# Patient Record
Sex: MALE | Race: BLACK OR AFRICAN AMERICAN | Employment: OTHER | URBAN - METROPOLITAN AREA
[De-identification: names, ages, dates, MRNs, and addresses within clinical notes are randomized per-mention and may not be internally consistent; named-entity substitution may affect disease eponyms.]

---

## 2017-05-05 PROBLEM — R39.9 LOWER URINARY TRACT SYMPTOMS (LUTS): Status: ACTIVE | Noted: 2017-05-05

## 2017-05-05 PROBLEM — N40.2 PROSTATE NODULE: Status: ACTIVE | Noted: 2017-05-05

## 2017-08-15 ENCOUNTER — HOSPITAL ENCOUNTER (OUTPATIENT)
Dept: LAB | Age: 82
Discharge: HOME | End: 2017-08-15
Payer: MEDICARE

## 2017-08-15 DIAGNOSIS — I10 UNSPECIFIED ESSENTIAL HYPERTENSION: ICD-10-CM

## 2017-08-15 DIAGNOSIS — E78.5 HYPERLIPIDEMIA: ICD-10-CM

## 2017-08-15 LAB
ALBUMIN SERPL BCP-MCNC: 3.5 G/DL (ref 3.4–5)
ALBUMIN/GLOB SERPL: 1.1 {RATIO} (ref 0.8–1.7)
ALP SERPL-CCNC: 67 U/L (ref 45–117)
ALT SERPL-CCNC: 21 U/L (ref 16–61)
ANION GAP BLD CALC-SCNC: 10 MMOL/L (ref 3–18)
AST SERPL W P-5'-P-CCNC: 13 U/L (ref 15–37)
BILIRUB SERPL-MCNC: 0.8 MG/DL (ref 0.2–1)
BUN SERPL-MCNC: 23 MG/DL (ref 7–18)
BUN/CREAT SERPL: 18 (ref 12–20)
CALCIUM SERPL-MCNC: 8.7 MG/DL (ref 8.5–10.1)
CHLORIDE SERPL-SCNC: 108 MMOL/L (ref 100–108)
CHOLEST SERPL-MCNC: 88 MG/DL
CO2 SERPL-SCNC: 25 MMOL/L (ref 21–32)
CREAT SERPL-MCNC: 1.27 MG/DL (ref 0.6–1.3)
GLOBULIN SER CALC-MCNC: 3.1 G/DL (ref 2–4)
GLUCOSE SERPL-MCNC: 95 MG/DL (ref 74–99)
HDLC SERPL-MCNC: 40 MG/DL (ref 40–60)
HDLC SERPL: 2.2 {RATIO} (ref 0–5)
LDLC SERPL CALC-MCNC: 35.6 MG/DL (ref 0–100)
LIPID PROFILE,FLP: NORMAL
POTASSIUM SERPL-SCNC: 4.1 MMOL/L (ref 3.5–5.5)
PROT SERPL-MCNC: 6.6 G/DL (ref 6.4–8.2)
SODIUM SERPL-SCNC: 143 MMOL/L (ref 136–145)
TRIGL SERPL-MCNC: 62 MG/DL (ref ?–150)
VLDLC SERPL CALC-MCNC: 12.4 MG/DL

## 2017-08-15 PROCEDURE — 80061 LIPID PANEL: CPT | Performed by: FAMILY MEDICINE

## 2017-08-15 PROCEDURE — 36415 COLL VENOUS BLD VENIPUNCTURE: CPT | Performed by: FAMILY MEDICINE

## 2017-08-15 PROCEDURE — 80053 COMPREHEN METABOLIC PANEL: CPT | Performed by: FAMILY MEDICINE

## 2018-01-01 ENCOUNTER — HOSPITAL ENCOUNTER (OUTPATIENT)
Dept: LAB | Age: 83
Discharge: HOME | End: 2018-05-15
Payer: MEDICARE

## 2018-01-01 ENCOUNTER — OFFICE VISIT (OUTPATIENT)
Dept: FAMILY MEDICINE CLINIC | Age: 83
End: 2018-01-01

## 2018-01-01 ENCOUNTER — DOCUMENTATION ONLY (OUTPATIENT)
Dept: FAMILY MEDICINE CLINIC | Age: 83
End: 2018-01-01

## 2018-01-01 ENCOUNTER — HOSPITAL ENCOUNTER (OUTPATIENT)
Dept: LAB | Age: 83
Discharge: HOME | End: 2018-10-17
Payer: MEDICARE

## 2018-01-01 ENCOUNTER — APPOINTMENT (OUTPATIENT)
Dept: CT IMAGING | Age: 83
End: 2018-01-01
Attending: EMERGENCY MEDICINE
Payer: MEDICARE

## 2018-01-01 ENCOUNTER — TELEPHONE (OUTPATIENT)
Dept: FAMILY MEDICINE CLINIC | Age: 83
End: 2018-01-01

## 2018-01-01 ENCOUNTER — HOSPITAL ENCOUNTER (OUTPATIENT)
Dept: LAB | Age: 83
Discharge: HOME | End: 2018-10-16
Payer: MEDICARE

## 2018-01-01 ENCOUNTER — HOSPITAL ENCOUNTER (OUTPATIENT)
Dept: LAB | Age: 83
Discharge: HOME | End: 2018-10-11
Payer: MEDICARE

## 2018-01-01 ENCOUNTER — HOSPITAL ENCOUNTER (EMERGENCY)
Age: 83
Discharge: HOME | End: 2018-11-06
Attending: EMERGENCY MEDICINE
Payer: MEDICARE

## 2018-01-01 ENCOUNTER — HOSPITAL ENCOUNTER (OUTPATIENT)
Dept: LAB | Age: 83
Discharge: HOME | End: 2018-08-21
Payer: MEDICARE

## 2018-01-01 ENCOUNTER — HOSPITAL ENCOUNTER (OUTPATIENT)
Dept: CT IMAGING | Age: 83
Discharge: HOME | End: 2018-10-05
Attending: NURSE PRACTITIONER
Payer: MEDICARE

## 2018-01-01 ENCOUNTER — OFFICE VISIT (OUTPATIENT)
Dept: PULMONOLOGY | Age: 83
End: 2018-01-01

## 2018-01-01 ENCOUNTER — HOSPITAL ENCOUNTER (OUTPATIENT)
Dept: CT IMAGING | Age: 83
Discharge: HOME | End: 2018-10-24
Attending: RADIOLOGY | Admitting: RADIOLOGY
Payer: MEDICARE

## 2018-01-01 ENCOUNTER — HOSPITAL ENCOUNTER (OUTPATIENT)
Dept: LAB | Age: 83
Discharge: HOME | End: 2018-11-09

## 2018-01-01 ENCOUNTER — HOSPITAL ENCOUNTER (OUTPATIENT)
Dept: CT IMAGING | Age: 83
Discharge: HOME | End: 2018-10-18
Attending: INTERNAL MEDICINE
Payer: MEDICARE

## 2018-01-01 ENCOUNTER — HOSPITAL ENCOUNTER (OUTPATIENT)
Dept: RESPIRATORY THERAPY | Age: 83
Discharge: HOME | End: 2018-11-29
Attending: INTERNAL MEDICINE
Payer: MEDICARE

## 2018-01-01 ENCOUNTER — HOSPITAL ENCOUNTER (OUTPATIENT)
Dept: NON INVASIVE DIAGNOSTICS | Age: 83
Discharge: HOME | End: 2018-11-29
Attending: INTERNAL MEDICINE
Payer: MEDICARE

## 2018-01-01 ENCOUNTER — HOSPITAL ENCOUNTER (OUTPATIENT)
Dept: LAB | Age: 83
Discharge: HOME | End: 2018-09-21
Payer: MEDICARE

## 2018-01-01 VITALS
DIASTOLIC BLOOD PRESSURE: 80 MMHG | HEIGHT: 71 IN | RESPIRATION RATE: 22 BRPM | HEART RATE: 72 BPM | SYSTOLIC BLOOD PRESSURE: 130 MMHG | OXYGEN SATURATION: 98 % | WEIGHT: 207 LBS | BODY MASS INDEX: 28.98 KG/M2 | TEMPERATURE: 97.5 F

## 2018-01-01 VITALS
WEIGHT: 212 LBS | OXYGEN SATURATION: 99 % | BODY MASS INDEX: 30.35 KG/M2 | HEIGHT: 70 IN | HEART RATE: 76 BPM | DIASTOLIC BLOOD PRESSURE: 68 MMHG | TEMPERATURE: 98.4 F | SYSTOLIC BLOOD PRESSURE: 139 MMHG | RESPIRATION RATE: 20 BRPM

## 2018-01-01 VITALS
RESPIRATION RATE: 18 BRPM | HEIGHT: 71 IN | SYSTOLIC BLOOD PRESSURE: 146 MMHG | WEIGHT: 213 LBS | OXYGEN SATURATION: 99 % | TEMPERATURE: 98.1 F | BODY MASS INDEX: 29.82 KG/M2 | DIASTOLIC BLOOD PRESSURE: 86 MMHG | HEART RATE: 81 BPM

## 2018-01-01 VITALS
DIASTOLIC BLOOD PRESSURE: 78 MMHG | WEIGHT: 210 LBS | SYSTOLIC BLOOD PRESSURE: 126 MMHG | RESPIRATION RATE: 20 BRPM | OXYGEN SATURATION: 98 % | TEMPERATURE: 98.2 F | HEIGHT: 70 IN | BODY MASS INDEX: 30.06 KG/M2 | HEART RATE: 84 BPM

## 2018-01-01 VITALS
RESPIRATION RATE: 16 BRPM | HEART RATE: 77 BPM | TEMPERATURE: 98.2 F | HEIGHT: 70 IN | SYSTOLIC BLOOD PRESSURE: 127 MMHG | BODY MASS INDEX: 32.5 KG/M2 | WEIGHT: 227 LBS | DIASTOLIC BLOOD PRESSURE: 78 MMHG | OXYGEN SATURATION: 97 %

## 2018-01-01 VITALS
OXYGEN SATURATION: 100 % | RESPIRATION RATE: 16 BRPM | WEIGHT: 207 LBS | HEART RATE: 83 BPM | BODY MASS INDEX: 28.98 KG/M2 | TEMPERATURE: 97.6 F | DIASTOLIC BLOOD PRESSURE: 83 MMHG | SYSTOLIC BLOOD PRESSURE: 148 MMHG | HEIGHT: 71 IN

## 2018-01-01 VITALS
OXYGEN SATURATION: 99 % | TEMPERATURE: 98.7 F | HEIGHT: 70 IN | WEIGHT: 214 LBS | DIASTOLIC BLOOD PRESSURE: 64 MMHG | RESPIRATION RATE: 24 BRPM | HEART RATE: 84 BPM | SYSTOLIC BLOOD PRESSURE: 118 MMHG | BODY MASS INDEX: 30.64 KG/M2

## 2018-01-01 VITALS
OXYGEN SATURATION: 98 % | TEMPERATURE: 98 F | HEART RATE: 81 BPM | DIASTOLIC BLOOD PRESSURE: 87 MMHG | BODY MASS INDEX: 30.72 KG/M2 | RESPIRATION RATE: 16 BRPM | SYSTOLIC BLOOD PRESSURE: 149 MMHG | HEIGHT: 70 IN | WEIGHT: 214.6 LBS

## 2018-01-01 VITALS
RESPIRATION RATE: 16 BRPM | HEIGHT: 70 IN | BODY MASS INDEX: 31.35 KG/M2 | DIASTOLIC BLOOD PRESSURE: 72 MMHG | HEART RATE: 81 BPM | SYSTOLIC BLOOD PRESSURE: 108 MMHG | WEIGHT: 219 LBS | OXYGEN SATURATION: 98 % | TEMPERATURE: 98.2 F

## 2018-01-01 VITALS
SYSTOLIC BLOOD PRESSURE: 130 MMHG | BODY MASS INDEX: 28.98 KG/M2 | DIASTOLIC BLOOD PRESSURE: 80 MMHG | HEIGHT: 71 IN | WEIGHT: 207 LBS

## 2018-01-01 VITALS
WEIGHT: 217 LBS | DIASTOLIC BLOOD PRESSURE: 65 MMHG | SYSTOLIC BLOOD PRESSURE: 110 MMHG | BODY MASS INDEX: 31.07 KG/M2 | HEART RATE: 82 BPM | TEMPERATURE: 98.6 F | HEIGHT: 70 IN | OXYGEN SATURATION: 100 % | RESPIRATION RATE: 20 BRPM

## 2018-01-01 DIAGNOSIS — N39.0 E. COLI UTI: Primary | ICD-10-CM

## 2018-01-01 DIAGNOSIS — Z23 ENCOUNTER FOR IMMUNIZATION: ICD-10-CM

## 2018-01-01 DIAGNOSIS — R06.02 SOB (SHORTNESS OF BREATH): ICD-10-CM

## 2018-01-01 DIAGNOSIS — Z77.090 H/O ASBESTOS EXPOSURE: Chronic | ICD-10-CM

## 2018-01-01 DIAGNOSIS — C78.00 MALIGNANT NEOPLASM METASTATIC TO LUNG (HCC): ICD-10-CM

## 2018-01-01 DIAGNOSIS — M54.9 BACK PAIN, UNSPECIFIED BACK LOCATION, UNSPECIFIED BACK PAIN LATERALITY, UNSPECIFIED CHRONICITY: ICD-10-CM

## 2018-01-01 DIAGNOSIS — B96.20 E. COLI UTI: Primary | ICD-10-CM

## 2018-01-01 DIAGNOSIS — R06.09 DYSPNEA ON EXERTION: ICD-10-CM

## 2018-01-01 DIAGNOSIS — E11.21 TYPE 2 DIABETES WITH NEPHROPATHY (HCC): ICD-10-CM

## 2018-01-01 DIAGNOSIS — N30.00 ACUTE CYSTITIS WITHOUT HEMATURIA: ICD-10-CM

## 2018-01-01 DIAGNOSIS — I10 ESSENTIAL HYPERTENSION: ICD-10-CM

## 2018-01-01 DIAGNOSIS — C78.00 MALIGNANT NEOPLASM METASTATIC TO LUNG, UNSPECIFIED LATERALITY (HCC): ICD-10-CM

## 2018-01-01 DIAGNOSIS — R06.02 SOB (SHORTNESS OF BREATH) ON EXERTION: Primary | ICD-10-CM

## 2018-01-01 DIAGNOSIS — R16.0 LIVER MASS: ICD-10-CM

## 2018-01-01 DIAGNOSIS — R10.9 ABDOMINAL PAIN, UNSPECIFIED ABDOMINAL LOCATION: ICD-10-CM

## 2018-01-01 DIAGNOSIS — R06.02 SHORTNESS OF BREATH: ICD-10-CM

## 2018-01-01 DIAGNOSIS — N30.01 ACUTE CYSTITIS WITH HEMATURIA: Primary | ICD-10-CM

## 2018-01-01 DIAGNOSIS — Z74.09 LIMITED MOBILITY: ICD-10-CM

## 2018-01-01 DIAGNOSIS — S01.81XD LACERATION OF FOREHEAD, SUBSEQUENT ENCOUNTER: ICD-10-CM

## 2018-01-01 DIAGNOSIS — R93.89 ABNORMAL CHEST CT: Primary | ICD-10-CM

## 2018-01-01 DIAGNOSIS — E11.65 TYPE 2 DIABETES MELLITUS WITH HYPERGLYCEMIA, WITHOUT LONG-TERM CURRENT USE OF INSULIN (HCC): ICD-10-CM

## 2018-01-01 DIAGNOSIS — R91.8 MULTIPLE PULMONARY NODULES: ICD-10-CM

## 2018-01-01 DIAGNOSIS — N30.00 ACUTE CYSTITIS WITHOUT HEMATURIA: Primary | ICD-10-CM

## 2018-01-01 DIAGNOSIS — Z00.00 MEDICARE ANNUAL WELLNESS VISIT, SUBSEQUENT: Primary | ICD-10-CM

## 2018-01-01 DIAGNOSIS — E11.65 TYPE 2 DIABETES MELLITUS WITH HYPERGLYCEMIA, WITHOUT LONG-TERM CURRENT USE OF INSULIN (HCC): Primary | ICD-10-CM

## 2018-01-01 DIAGNOSIS — Z86.711 HISTORY OF PULMONARY EMBOLISM: ICD-10-CM

## 2018-01-01 DIAGNOSIS — J61 ASBESTOSIS (HCC): ICD-10-CM

## 2018-01-01 DIAGNOSIS — R53.81 PHYSICAL DECONDITIONING: ICD-10-CM

## 2018-01-01 DIAGNOSIS — K76.89 LIVER DYSFUNCTION: ICD-10-CM

## 2018-01-01 DIAGNOSIS — L20.84 INTRINSIC ECZEMA: Primary | ICD-10-CM

## 2018-01-01 DIAGNOSIS — R42 DIZZINESS: ICD-10-CM

## 2018-01-01 DIAGNOSIS — S00.83XA FACIAL CONTUSION, INITIAL ENCOUNTER: Primary | ICD-10-CM

## 2018-01-01 DIAGNOSIS — N30.01 ACUTE CYSTITIS WITH HEMATURIA: ICD-10-CM

## 2018-01-01 DIAGNOSIS — R06.02 SOB (SHORTNESS OF BREATH) ON EXERTION: ICD-10-CM

## 2018-01-01 DIAGNOSIS — J98.4 RESTRICTIVE LUNG DISEASE: ICD-10-CM

## 2018-01-01 DIAGNOSIS — E04.1 LEFT THYROID NODULE: ICD-10-CM

## 2018-01-01 DIAGNOSIS — J61 ASBESTOSIS (HCC): Primary | ICD-10-CM

## 2018-01-01 DIAGNOSIS — K76.9 LIVER LESION: ICD-10-CM

## 2018-01-01 LAB
AFP L3 MFR SERPL: 72.1 % (ref 0–9.9)
AFP SERPL-MCNC: 9 NG/ML (ref 0–8)
ALBUMIN SERPL-MCNC: 3.4 G/DL (ref 3.4–5)
ALBUMIN/GLOB SERPL: 1.1 {RATIO} (ref 0.8–1.7)
ALP SERPL-CCNC: 89 U/L (ref 45–117)
ALT SERPL-CCNC: 28 U/L (ref 16–61)
ANION GAP SERPL CALC-SCNC: 10 MMOL/L (ref 3–18)
ANION GAP SERPL CALC-SCNC: 6 MMOL/L (ref 3–18)
ANION GAP SERPL CALC-SCNC: 8 MMOL/L (ref 3–18)
APTT PPP: 23.5 SEC (ref 23–36.4)
AST SERPL-CCNC: 19 U/L (ref 15–37)
BACTERIA SPEC CULT: ABNORMAL
BACTERIA SPEC CULT: NORMAL
BASOPHILS # BLD: 0 K/UL (ref 0–0.1)
BASOPHILS # BLD: 0 K/UL (ref 0–0.1)
BASOPHILS NFR BLD: 0 % (ref 0–2)
BASOPHILS NFR BLD: 0 % (ref 0–2)
BILIRUB SERPL-MCNC: 0.6 MG/DL (ref 0.2–1)
BILIRUB UR QL STRIP: NEGATIVE
BILIRUB UR QL STRIP: NEGATIVE
BUN SERPL-MCNC: 16 MG/DL (ref 7–18)
BUN SERPL-MCNC: 21 MG/DL (ref 7–18)
BUN SERPL-MCNC: 25 MG/DL (ref 7–18)
BUN/CREAT SERPL: 15 (ref 12–20)
BUN/CREAT SERPL: 17 (ref 12–20)
BUN/CREAT SERPL: 21 (ref 12–20)
BUN/CREAT SERPL: 23 (ref 12–20)
BUN/CREAT SERPL: 23 (ref 12–20)
CALCIUM SERPL-MCNC: 8.1 MG/DL (ref 8.5–10.1)
CALCIUM SERPL-MCNC: 8.6 MG/DL (ref 8.5–10.1)
CALCIUM SERPL-MCNC: 8.7 MG/DL (ref 8.5–10.1)
CALCIUM SERPL-MCNC: 8.8 MG/DL (ref 8.5–10.1)
CALCIUM SERPL-MCNC: 9.1 MG/DL (ref 8.5–10.1)
CEA SERPL-MCNC: 2.2 NG/ML
CHLORIDE SERPL-SCNC: 104 MMOL/L (ref 100–108)
CHLORIDE SERPL-SCNC: 105 MMOL/L (ref 100–108)
CHLORIDE SERPL-SCNC: 108 MMOL/L (ref 100–108)
CHLORIDE SERPL-SCNC: 109 MMOL/L (ref 100–108)
CHLORIDE SERPL-SCNC: 109 MMOL/L (ref 100–108)
CHOLEST SERPL-MCNC: 80 MG/DL
CO2 SERPL-SCNC: 27 MMOL/L (ref 21–32)
CO2 SERPL-SCNC: 30 MMOL/L (ref 21–32)
CREAT SERPL-MCNC: 1.06 MG/DL (ref 0.6–1.3)
CREAT SERPL-MCNC: 1.07 MG/DL (ref 0.6–1.3)
CREAT SERPL-MCNC: 1.1 MG/DL (ref 0.6–1.3)
CREAT SERPL-MCNC: 1.19 MG/DL (ref 0.6–1.3)
CREAT SERPL-MCNC: 1.23 MG/DL (ref 0.6–1.3)
CREAT UR-MCNC: 244.56 MG/DL (ref 30–125)
DIFFERENTIAL METHOD BLD: ABNORMAL
DIFFERENTIAL METHOD BLD: ABNORMAL
ECHO AO ROOT DIAM: 4.06 CM
ECHO LA AREA 4C: 21.8 CM2
ECHO LA VOL 2C: 28.94 ML (ref 18–58)
ECHO LA VOL 4C: 56.81 ML (ref 18–58)
ECHO LA VOL BP: 47.03 ML (ref 18–58)
ECHO LA VOL/BSA BIPLANE: 21.98 ML/M2 (ref 16–28)
ECHO LA VOLUME INDEX A2C: 13.52 ML/M2 (ref 16–28)
ECHO LA VOLUME INDEX A4C: 26.55 ML/M2 (ref 16–28)
ECHO LV INTERNAL DIMENSION DIASTOLIC: 2.84 CM (ref 4.2–5.9)
ECHO LV INTERNAL DIMENSION SYSTOLIC: 1.92 CM
ECHO LV IVSD: 1.67 CM (ref 0.6–1)
ECHO LV MASS 2D: 188.2 G (ref 88–224)
ECHO LV MASS INDEX 2D: 87.9 G/M2 (ref 49–115)
ECHO LV POSTERIOR WALL DIASTOLIC: 1.49 CM (ref 0.6–1)
ECHO LVOT DIAM: 2.31 CM
ECHO LVOT PEAK GRADIENT: 1.7 MMHG
ECHO LVOT PEAK VELOCITY: 65.47 CM/S
ECHO LVOT VTI: 13.63 CM
ECHO MV A VELOCITY: 59.37 CM/S
ECHO MV E DECELERATION TIME (DT): 257 MS
ECHO MV E VELOCITY: 0.33 CM/S
ECHO MV E/A RATIO: 0.01
ECHO PULMONARY ARTERY SYSTOLIC PRESSURE (PASP): 55 MMHG
ECHO RV INTERNAL DIMENSION: 3.84 CM
ECHO TV REGURGITANT MAX VELOCITY: 360.57 CM/S
ECHO TV REGURGITANT PEAK GRADIENT: 52 MMHG
EOSINOPHIL # BLD: 0.2 K/UL (ref 0–0.4)
EOSINOPHIL # BLD: 0.2 K/UL (ref 0–0.4)
EOSINOPHIL NFR BLD: 2 % (ref 0–5)
EOSINOPHIL NFR BLD: 2 % (ref 0–5)
ERYTHROCYTE [DISTWIDTH] IN BLOOD BY AUTOMATED COUNT: 13.1 % (ref 11.6–14.5)
ERYTHROCYTE [DISTWIDTH] IN BLOOD BY AUTOMATED COUNT: 13.3 % (ref 11.6–14.5)
ERYTHROCYTE [DISTWIDTH] IN BLOOD BY AUTOMATED COUNT: 13.6 % (ref 11.6–14.5)
EST. AVERAGE GLUCOSE BLD GHB EST-MCNC: 105 MG/DL
EST. AVERAGE GLUCOSE BLD GHB EST-MCNC: 120 MG/DL
GLOBULIN SER CALC-MCNC: 3.2 G/DL (ref 2–4)
GLUCOSE BLD STRIP.AUTO-MCNC: 85 MG/DL (ref 70–110)
GLUCOSE SERPL-MCNC: 124 MG/DL (ref 74–99)
GLUCOSE SERPL-MCNC: 81 MG/DL (ref 74–99)
GLUCOSE SERPL-MCNC: 94 MG/DL (ref 74–99)
GLUCOSE SERPL-MCNC: 96 MG/DL (ref 74–99)
GLUCOSE SERPL-MCNC: 97 MG/DL (ref 74–99)
GLUCOSE UR-MCNC: NEGATIVE MG/DL
GLUCOSE UR-MCNC: NEGATIVE MG/DL
HBA1C MFR BLD: 5.3 % (ref 4.2–5.6)
HBA1C MFR BLD: 5.8 % (ref 4.2–5.6)
HCT VFR BLD AUTO: 39 % (ref 36–48)
HCT VFR BLD AUTO: 39.9 % (ref 36–48)
HCT VFR BLD AUTO: 42.1 % (ref 36–48)
HDLC SERPL-MCNC: 41 MG/DL (ref 40–60)
HDLC SERPL: 2 {RATIO} (ref 0–5)
HGB BLD-MCNC: 13.4 G/DL (ref 13–16)
HGB BLD-MCNC: 13.4 G/DL (ref 13–16)
HGB BLD-MCNC: 13.6 G/DL (ref 13–16)
INR PPP: 1.1 (ref 0.8–1.2)
INR PPP: 1.1 (ref 0.8–1.2)
KETONES P FAST UR STRIP-MCNC: NEGATIVE MG/DL
KETONES P FAST UR STRIP-MCNC: NEGATIVE MG/DL
LDLC SERPL CALC-MCNC: 26.4 MG/DL (ref 0–100)
LIPID PROFILE,FLP: NORMAL
LYMPHOCYTES # BLD: 1.3 K/UL (ref 0.9–3.6)
LYMPHOCYTES # BLD: 1.3 K/UL (ref 0.9–3.6)
LYMPHOCYTES NFR BLD: 16 % (ref 21–52)
LYMPHOCYTES NFR BLD: 17 % (ref 21–52)
MCH RBC QN AUTO: 29.8 PG (ref 24–34)
MCH RBC QN AUTO: 30.2 PG (ref 24–34)
MCH RBC QN AUTO: 30.2 PG (ref 24–34)
MCHC RBC AUTO-ENTMCNC: 32.3 G/DL (ref 31–37)
MCHC RBC AUTO-ENTMCNC: 33.6 G/DL (ref 31–37)
MCHC RBC AUTO-ENTMCNC: 34.4 G/DL (ref 31–37)
MCV RBC AUTO: 88 FL (ref 74–97)
MCV RBC AUTO: 88.9 FL (ref 74–97)
MCV RBC AUTO: 93.6 FL (ref 74–97)
MICROALBUMIN UR-MCNC: 4.2 MG/DL (ref 0–3)
MICROALBUMIN/CREAT UR-RTO: 17 MG/G (ref 0–30)
MONOCYTES # BLD: 0.8 K/UL (ref 0.05–1.2)
MONOCYTES # BLD: 1.1 K/UL (ref 0.05–1.2)
MONOCYTES NFR BLD: 11 % (ref 3–10)
MONOCYTES NFR BLD: 13 % (ref 3–10)
NEUTS SEG # BLD: 5.3 K/UL (ref 1.8–8)
NEUTS SEG # BLD: 5.7 K/UL (ref 1.8–8)
NEUTS SEG NFR BLD: 69 % (ref 40–73)
NEUTS SEG NFR BLD: 70 % (ref 40–73)
PH UR STRIP: 6 [PH] (ref 4.6–8)
PH UR STRIP: 6.5 [PH] (ref 4.6–8)
PLATELET # BLD AUTO: 124 K/UL (ref 135–420)
PLATELET # BLD AUTO: 136 K/UL (ref 135–420)
PLATELET # BLD AUTO: 152 K/UL (ref 135–420)
PMV BLD AUTO: 11 FL (ref 9.2–11.8)
PMV BLD AUTO: 11.6 FL (ref 9.2–11.8)
PMV BLD AUTO: 12.3 FL (ref 9.2–11.8)
POTASSIUM SERPL-SCNC: 3.8 MMOL/L (ref 3.5–5.5)
POTASSIUM SERPL-SCNC: 4.2 MMOL/L (ref 3.5–5.5)
POTASSIUM SERPL-SCNC: 4.3 MMOL/L (ref 3.5–5.5)
POTASSIUM SERPL-SCNC: 4.3 MMOL/L (ref 3.5–5.5)
POTASSIUM SERPL-SCNC: 4.5 MMOL/L (ref 3.5–5.5)
PROT SERPL-MCNC: 6.6 G/DL (ref 6.4–8.2)
PROT UR QL STRIP: NEGATIVE
PROT UR QL STRIP: NEGATIVE
PROTHROMBIN TIME: 13.6 SEC (ref 11.5–15.2)
PROTHROMBIN TIME: 13.7 SEC (ref 11.5–15.2)
RBC # BLD AUTO: 4.43 M/UL (ref 4.7–5.5)
RBC # BLD AUTO: 4.49 M/UL (ref 4.7–5.5)
RBC # BLD AUTO: 4.5 M/UL (ref 4.7–5.5)
SERVICE CMNT-IMP: ABNORMAL
SERVICE CMNT-IMP: NORMAL
SODIUM SERPL-SCNC: 140 MMOL/L (ref 136–145)
SODIUM SERPL-SCNC: 141 MMOL/L (ref 136–145)
SODIUM SERPL-SCNC: 144 MMOL/L (ref 136–145)
SP GR UR STRIP: 1.01 (ref 1–1.03)
SP GR UR STRIP: 1.02 (ref 1–1.03)
TRIGL SERPL-MCNC: 63 MG/DL (ref ?–150)
UA UROBILINOGEN AMB POC: NORMAL (ref 0.2–1)
UA UROBILINOGEN AMB POC: NORMAL (ref 0.2–1)
URINALYSIS CLARITY POC: CLEAR
URINALYSIS CLARITY POC: CLEAR
URINALYSIS COLOR POC: YELLOW
URINALYSIS COLOR POC: YELLOW
URINE BLOOD POC: NEGATIVE
URINE BLOOD POC: NORMAL
URINE LEUKOCYTES POC: NORMAL
URINE LEUKOCYTES POC: NORMAL
URINE NITRITES POC: NEGATIVE
URINE NITRITES POC: NEGATIVE
VLDLC SERPL CALC-MCNC: 12.6 MG/DL
WBC # BLD AUTO: 7.5 K/UL (ref 4.6–13.2)
WBC # BLD AUTO: 7.5 K/UL (ref 4.6–13.2)
WBC # BLD AUTO: 8.3 K/UL (ref 4.6–13.2)

## 2018-01-01 PROCEDURE — 80061 LIPID PANEL: CPT | Performed by: FAMILY MEDICINE

## 2018-01-01 PROCEDURE — 99283 EMERGENCY DEPT VISIT LOW MDM: CPT

## 2018-01-01 PROCEDURE — 88307 TISSUE EXAM BY PATHOLOGIST: CPT

## 2018-01-01 PROCEDURE — 82043 UR ALBUMIN QUANTITATIVE: CPT | Performed by: FAMILY MEDICINE

## 2018-01-01 PROCEDURE — 83036 HEMOGLOBIN GLYCOSYLATED A1C: CPT | Performed by: FAMILY MEDICINE

## 2018-01-01 PROCEDURE — 74178 CT ABD&PLV WO CNTR FLWD CNTR: CPT

## 2018-01-01 PROCEDURE — 88342 IMHCHEM/IMCYTCHM 1ST ANTB: CPT | Performed by: INTERNAL MEDICINE

## 2018-01-01 PROCEDURE — 88341 IMHCHEM/IMCYTCHM EA ADD ANTB: CPT | Performed by: INTERNAL MEDICINE

## 2018-01-01 PROCEDURE — 80048 BASIC METABOLIC PNL TOTAL CA: CPT | Performed by: FAMILY MEDICINE

## 2018-01-01 PROCEDURE — 88333 PATH CONSLTJ SURG CYTO XM 1: CPT | Performed by: INTERNAL MEDICINE

## 2018-01-01 PROCEDURE — 70450 CT HEAD/BRAIN W/O DYE: CPT

## 2018-01-01 PROCEDURE — 85027 COMPLETE CBC AUTOMATED: CPT | Performed by: INTERNAL MEDICINE

## 2018-01-01 PROCEDURE — 36415 COLL VENOUS BLD VENIPUNCTURE: CPT | Performed by: FAMILY MEDICINE

## 2018-01-01 PROCEDURE — 70486 CT MAXILLOFACIAL W/O DYE: CPT

## 2018-01-01 PROCEDURE — 93306 TTE W/DOPPLER COMPLETE: CPT

## 2018-01-01 PROCEDURE — 80048 BASIC METABOLIC PNL TOTAL CA: CPT | Performed by: RADIOLOGY

## 2018-01-01 PROCEDURE — 36415 COLL VENOUS BLD VENIPUNCTURE: CPT | Performed by: INTERNAL MEDICINE

## 2018-01-01 PROCEDURE — 74011250636 HC RX REV CODE- 250/636: Performed by: INTERNAL MEDICINE

## 2018-01-01 PROCEDURE — 71275 CT ANGIOGRAPHY CHEST: CPT

## 2018-01-01 PROCEDURE — 85610 PROTHROMBIN TIME: CPT | Performed by: INTERNAL MEDICINE

## 2018-01-01 PROCEDURE — 82378 CARCINOEMBRYONIC ANTIGEN: CPT | Performed by: INTERNAL MEDICINE

## 2018-01-01 PROCEDURE — 74011250637 HC RX REV CODE- 250/637: Performed by: RADIOLOGY

## 2018-01-01 PROCEDURE — 99152 MOD SED SAME PHYS/QHP 5/>YRS: CPT

## 2018-01-01 PROCEDURE — 87077 CULTURE AEROBIC IDENTIFY: CPT | Performed by: NURSE PRACTITIONER

## 2018-01-01 PROCEDURE — 85025 COMPLETE CBC W/AUTO DIFF WBC: CPT | Performed by: NURSE PRACTITIONER

## 2018-01-01 PROCEDURE — 94729 DIFFUSING CAPACITY: CPT

## 2018-01-01 PROCEDURE — 36415 COLL VENOUS BLD VENIPUNCTURE: CPT | Performed by: NURSE PRACTITIONER

## 2018-01-01 PROCEDURE — 74011636320 HC RX REV CODE- 636/320: Performed by: NURSE PRACTITIONER

## 2018-01-01 PROCEDURE — 85730 THROMBOPLASTIN TIME PARTIAL: CPT | Performed by: INTERNAL MEDICINE

## 2018-01-01 PROCEDURE — 74011250636 HC RX REV CODE- 250/636

## 2018-01-01 PROCEDURE — 74011636320 HC RX REV CODE- 636/320: Performed by: INTERNAL MEDICINE

## 2018-01-01 PROCEDURE — 94010 BREATHING CAPACITY TEST: CPT

## 2018-01-01 PROCEDURE — 87086 URINE CULTURE/COLONY COUNT: CPT | Performed by: NURSE PRACTITIONER

## 2018-01-01 PROCEDURE — 82962 GLUCOSE BLOOD TEST: CPT

## 2018-01-01 PROCEDURE — 94727 GAS DIL/WSHOT DETER LNG VOL: CPT

## 2018-01-01 PROCEDURE — 85025 COMPLETE CBC W/AUTO DIFF WBC: CPT | Performed by: INTERNAL MEDICINE

## 2018-01-01 PROCEDURE — 80053 COMPREHEN METABOLIC PANEL: CPT | Performed by: INTERNAL MEDICINE

## 2018-01-01 PROCEDURE — 88305 TISSUE EXAM BY PATHOLOGIST: CPT | Performed by: INTERNAL MEDICINE

## 2018-01-01 PROCEDURE — 82107 ALPHA-FETOPROTEIN L3: CPT | Performed by: INTERNAL MEDICINE

## 2018-01-01 PROCEDURE — 80048 BASIC METABOLIC PNL TOTAL CA: CPT | Performed by: NURSE PRACTITIONER

## 2018-01-01 PROCEDURE — 87186 SC STD MICRODIL/AGAR DIL: CPT | Performed by: NURSE PRACTITIONER

## 2018-01-01 RX ORDER — MIDAZOLAM HYDROCHLORIDE 1 MG/ML
1 INJECTION, SOLUTION INTRAMUSCULAR; INTRAVENOUS
Status: DISCONTINUED | OUTPATIENT
Start: 2018-01-01 | End: 2018-01-01 | Stop reason: HOSPADM

## 2018-01-01 RX ORDER — ATORVASTATIN CALCIUM 80 MG/1
80 TABLET, FILM COATED ORAL
Qty: 90 TAB | Refills: 3 | Status: SHIPPED | OUTPATIENT
Start: 2018-01-01

## 2018-01-01 RX ORDER — FENTANYL CITRATE 50 UG/ML
50 INJECTION, SOLUTION INTRAMUSCULAR; INTRAVENOUS
Status: DISCONTINUED | OUTPATIENT
Start: 2018-01-01 | End: 2018-01-01 | Stop reason: HOSPADM

## 2018-01-01 RX ORDER — TAMSULOSIN HYDROCHLORIDE 0.4 MG/1
0.4 CAPSULE ORAL
Qty: 90 CAP | Refills: 3 | Status: SHIPPED | OUTPATIENT
Start: 2018-01-01

## 2018-01-01 RX ORDER — METOPROLOL TARTRATE 50 MG/1
50 TABLET ORAL EVERY 12 HOURS
Qty: 90 TAB | Refills: 3 | Status: SHIPPED | OUTPATIENT
Start: 2018-01-01 | End: 2018-01-01 | Stop reason: SDUPTHER

## 2018-01-01 RX ORDER — FENTANYL CITRATE 50 UG/ML
INJECTION, SOLUTION INTRAMUSCULAR; INTRAVENOUS
Status: COMPLETED
Start: 2018-01-01 | End: 2018-01-01

## 2018-01-01 RX ORDER — METOPROLOL TARTRATE 50 MG/1
50 TABLET ORAL ONCE
Status: COMPLETED | OUTPATIENT
Start: 2018-01-01 | End: 2018-01-01

## 2018-01-01 RX ORDER — LEVOFLOXACIN 250 MG/1
250 TABLET ORAL DAILY
Qty: 3 TAB | Refills: 0 | Status: SHIPPED | OUTPATIENT
Start: 2018-01-01 | End: 2018-01-01

## 2018-01-01 RX ORDER — METOPROLOL TARTRATE 50 MG/1
50 TABLET ORAL EVERY 12 HOURS
Qty: 180 TAB | Refills: 3 | Status: SHIPPED | OUTPATIENT
Start: 2018-01-01

## 2018-01-01 RX ORDER — SODIUM CHLORIDE 9 MG/ML
20 INJECTION, SOLUTION INTRAVENOUS CONTINUOUS
Status: DISCONTINUED | OUTPATIENT
Start: 2018-01-01 | End: 2018-01-01 | Stop reason: HOSPADM

## 2018-01-01 RX ORDER — TRIAMCINOLONE ACETONIDE 1 MG/G
CREAM TOPICAL 2 TIMES DAILY
Qty: 30 G | Refills: 1 | Status: SHIPPED | OUTPATIENT
Start: 2018-01-01

## 2018-01-01 RX ORDER — HYDROCODONE BITARTRATE AND ACETAMINOPHEN 5; 325 MG/1; MG/1
TABLET ORAL
Refills: 0 | COMMUNITY
Start: 2018-01-01

## 2018-01-01 RX ORDER — LEVOCETIRIZINE DIHYDROCHLORIDE 5 MG/1
5 TABLET, FILM COATED ORAL
Qty: 30 TAB | Refills: 2 | Status: SHIPPED | OUTPATIENT
Start: 2018-01-01

## 2018-01-01 RX ORDER — SODIUM CHLORIDE 0.9 % (FLUSH) 0.9 %
5-10 SYRINGE (ML) INJECTION AS NEEDED
Status: DISCONTINUED | OUTPATIENT
Start: 2018-01-01 | End: 2018-01-01 | Stop reason: HOSPADM

## 2018-01-01 RX ORDER — LIDOCAINE HYDROCHLORIDE 10 MG/ML
INJECTION, SOLUTION EPIDURAL; INFILTRATION; INTRACAUDAL; PERINEURAL
Status: COMPLETED
Start: 2018-01-01 | End: 2018-01-01

## 2018-01-01 RX ORDER — METOPROLOL TARTRATE 50 MG/1
50 TABLET ORAL EVERY 12 HOURS
Qty: 60 TAB | Refills: 3 | Status: SHIPPED | OUTPATIENT
Start: 2018-01-01 | End: 2018-01-01 | Stop reason: SDUPTHER

## 2018-01-01 RX ORDER — METOPROLOL TARTRATE 50 MG/1
50 TABLET ORAL EVERY 12 HOURS
Qty: 60 TAB | Refills: 1 | Status: SHIPPED | OUTPATIENT
Start: 2018-01-01 | End: 2018-01-01 | Stop reason: SDUPTHER

## 2018-01-01 RX ORDER — FLUMAZENIL 0.1 MG/ML
0.2 INJECTION INTRAVENOUS
Status: DISCONTINUED | OUTPATIENT
Start: 2018-01-01 | End: 2018-01-01 | Stop reason: HOSPADM

## 2018-01-01 RX ORDER — PROMETHAZINE HYDROCHLORIDE 12.5 MG/1
TABLET ORAL
Refills: 3 | COMMUNITY
Start: 2018-01-01

## 2018-01-01 RX ORDER — ONDANSETRON 8 MG/1
TABLET, ORALLY DISINTEGRATING ORAL
Refills: 3 | COMMUNITY
Start: 2018-01-01

## 2018-01-01 RX ORDER — ALBUTEROL SULFATE 0.83 MG/ML
2.5 SOLUTION RESPIRATORY (INHALATION)
Qty: 3 PACKAGE | Refills: 3 | Status: SHIPPED | OUTPATIENT
Start: 2018-01-01

## 2018-01-01 RX ORDER — ATORVASTATIN CALCIUM 80 MG/1
80 TABLET, FILM COATED ORAL
Qty: 30 TAB | Refills: 3 | Status: SHIPPED | OUTPATIENT
Start: 2018-01-01 | End: 2018-01-01 | Stop reason: SDUPTHER

## 2018-01-01 RX ORDER — SULFAMETHOXAZOLE AND TRIMETHOPRIM 800; 160 MG/1; MG/1
1 TABLET ORAL 2 TIMES DAILY
Qty: 6 TAB | Refills: 0 | Status: SHIPPED | OUTPATIENT
Start: 2018-01-01 | End: 2018-01-01

## 2018-01-01 RX ORDER — NALOXONE HYDROCHLORIDE 0.4 MG/ML
0.1 INJECTION, SOLUTION INTRAMUSCULAR; INTRAVENOUS; SUBCUTANEOUS
Status: DISCONTINUED | OUTPATIENT
Start: 2018-01-01 | End: 2018-01-01 | Stop reason: HOSPADM

## 2018-01-01 RX ORDER — SODIUM CHLORIDE 0.9 % (FLUSH) 0.9 %
5-10 SYRINGE (ML) INJECTION EVERY 8 HOURS
Status: DISCONTINUED | OUTPATIENT
Start: 2018-01-01 | End: 2018-01-01 | Stop reason: HOSPADM

## 2018-01-01 RX ORDER — MIDAZOLAM HYDROCHLORIDE 1 MG/ML
INJECTION, SOLUTION INTRAMUSCULAR; INTRAVENOUS
Status: COMPLETED
Start: 2018-01-01 | End: 2018-01-01

## 2018-01-01 RX ADMIN — FENTANYL CITRATE 50 MCG: 50 INJECTION, SOLUTION INTRAMUSCULAR; INTRAVENOUS at 12:05

## 2018-01-01 RX ADMIN — IOPAMIDOL 80 ML: 755 INJECTION, SOLUTION INTRAVENOUS at 17:00

## 2018-01-01 RX ADMIN — MIDAZOLAM HYDROCHLORIDE 1 MG: 2 INJECTION, SOLUTION INTRAMUSCULAR; INTRAVENOUS at 12:05

## 2018-01-01 RX ADMIN — FENTANYL CITRATE 50 MCG: 50 INJECTION INTRAMUSCULAR; INTRAVENOUS at 12:05

## 2018-01-01 RX ADMIN — METOPROLOL TARTRATE 50 MG: 50 TABLET ORAL at 13:26

## 2018-01-01 RX ADMIN — MIDAZOLAM HYDROCHLORIDE 1 MG: 1 INJECTION, SOLUTION INTRAMUSCULAR; INTRAVENOUS at 12:10

## 2018-01-01 RX ADMIN — SODIUM CHLORIDE 20 ML/HR: 900 INJECTION, SOLUTION INTRAVENOUS at 10:02

## 2018-01-01 RX ADMIN — IOPAMIDOL 96 ML: 612 INJECTION, SOLUTION INTRAVENOUS at 12:00

## 2018-01-01 RX ADMIN — MIDAZOLAM HYDROCHLORIDE 1 MG: 1 INJECTION, SOLUTION INTRAMUSCULAR; INTRAVENOUS at 12:05

## 2018-01-01 RX ADMIN — FENTANYL CITRATE 50 MCG: 50 INJECTION, SOLUTION INTRAMUSCULAR; INTRAVENOUS at 12:10

## 2018-01-01 RX ADMIN — LIDOCAINE HYDROCHLORIDE: 10 INJECTION, SOLUTION EPIDURAL; INFILTRATION; INTRACAUDAL; PERINEURAL at 12:00

## 2018-03-13 NOTE — PROGRESS NOTES
HISTORY OF PRESENT ILLNESS  Andrews Major is a 80 y.o. male. HPI  Patient is here today for evaluation and treatment of: Rash; he is new to the practice. Rash:  C/o rash on right side of neck for years, itches, is currently spreading; He has not used any meds for this. Needs refill on meds;     PMH,  Meds, Allergies, Family History, Social history reviewed        Current Outpatient Prescriptions:     tamsulosin (FLOMAX) 0.4 mg capsule, Take 1 Cap by mouth daily (after dinner). , Disp: 90 Cap, Rfl: 3    metoprolol (LOPRESSOR) 50 mg tablet, Take 1 Tab by mouth every twelve (12) hours. , Disp: 60 Tab, Rfl: 3    atorvastatin (LIPITOR) 80 mg tablet, Take 1 Tab by mouth nightly., Disp: 30 Tab, Rfl: 3      PMH,  Meds, Allergies, Family History, Social history reviewed    Review of Systems   Constitutional: Negative for chills and fever. Cardiovascular: Negative for chest pain and palpitations. Physical Exam   Constitutional: He appears well-developed and well-nourished. No distress. Cardiovascular: Normal rate and regular rhythm. Exam reveals no gallop and no friction rub. No murmur heard. Pulmonary/Chest: Breath sounds normal. No respiratory distress. He has no wheezes. He has no rales. Musculoskeletal: He exhibits no edema. Skin:   Right lateral neck with an erythematous macular flaky rash present;    Nursing note and vitals reviewed. Visit Vitals    /78 (BP 1 Location: Right arm, BP Patient Position: Sitting)    Pulse 77    Temp 98.2 °F (36.8 °C) (Oral)    Resp 16    Ht 5' 10\" (1.778 m)    Wt 227 lb (103 kg)    SpO2 97%    BMI 32.57 kg/m2         ASSESSMENT and PLAN    ICD-10-CM ICD-9-CM    1.  Intrinsic eczema L20.84 691.8        As above, new   treatment plan as listed below  Orders Placed This Encounter    tamsulosin (FLOMAX) 0.4 mg capsule    metoprolol tartrate (LOPRESSOR) 50 mg tablet    atorvastatin (LIPITOR) 80 mg tablet    triamcinolone acetonide (KENALOG) 0.1 % topical cream       Refilled meds needed  Follow-up Disposition:  Return in about 2 months (around 5/13/2018) for medicare well . An After Visit Summary was printed and given to the patient. This has been fully explained to the patient, who indicates understanding.

## 2018-03-13 NOTE — MR AVS SNAPSHOT
Maria Luz Gonzales 
 
 
 [address] [id number] [facility] [id number] [phone] Patient: Mari Duran MRN: [id number] NVT:[id number] Visit Information Date & Time Provider Department Dept. Phone Encounter #  
 3/13/2018 10:00 AM Nichelle Shonjay, [address] [place] [address] [phone] [id number] Follow-up Instructions Return in about 2 months (around 5/13/2018) for medicare well .  
  
 12/20/2018  1:45 PM  
Any with Slava De La Vega MD  
[facility] ([facility]) Appt Note: ep- 1 year follow up  
 [address] [facility] [address] [facility]  
  
   
 [address] [facility] [id number] [place] [address] Upcoming Health Maintenance Date Due  
 FOOT EXAM Q1 1/18/1943 MICROALBUMIN Q1 1/18/1943 EYE EXAM RETINAL OR DILATED Q1 1/18/1943 DTaP/Tdap/Td series (1 - Tdap) 1/18/1954 ZOSTER VACCINE AGE 60> 11/18/1992 GLAUCOMA SCREENING Q2Y 1/18/1998 Bone Densitometry (Dexa) Screening 1/18/1998 MEDICARE YEARLY EXAM 1/18/1998 HEMOGLOBIN A1C Q6M 1/23/2014 Pneumococcal 65+ Low/Medium Risk (2 of 2 - PCV13) 7/30/2014 Influenza Age 5 to Adult 8/1/2017 LIPID PANEL Q1 8/15/2018 Allergies as of 3/13/2018  Review Complete On: 3/13/2018 By: Lars Melendez LPN No Known Allergies Current Immunizations  Never Reviewed Name Date Pneumococcal Polysaccharide (PPSV-23) 7/30/2013 11:01 AM  
  
 Not reviewed this visit You Were Diagnosed With   
  
 Codes Comments Intrinsic eczema    -  Primary ICD-10-CM: L20.84 ICD-9-CM: 691.8 Vitals BP Pulse Temp Resp Height(growth percentile) Weight(growth percentile) 127/78 (BP 1 Location: Right arm, BP Patient Position: Sitting) 77 98.2 °F (36.8 °C) (Oral) 16 5' 10\" (1.778 m) 227 lb (103 kg) SpO2 BMI Smoking Status 97% 32.57 kg/m2 Never Smoker BMI and BSA Data Body Mass Index Body Surface Area 32.57 kg/m 2 2.26 m 2 Preferred Pharmacy Pharmacy Name Phone [address] [place] [address], [address] Your Updated Medication List  
  
   
This list is accurate as of 3/13/18 11:05 AM.  Always use your most recent med list.  
  
  
  
  
 atorvastatin 80 mg tablet Commonly known as:  LIPITOR Take 1 Tab by mouth nightly. metoprolol tartrate 50 mg tablet Commonly known as:  LOPRESSOR Take 1 Tab by mouth every twelve (12) hours. tamsulosin 0.4 mg capsule Commonly known as:  FLOMAX Take 1 Cap by mouth daily (after dinner). triamcinolone acetonide 0.1 % topical cream  
Commonly known as:  KENALOG Apply  to affected area two (2) times a day. use thin layer Prescriptions Printed Refills  
 tamsulosin (FLOMAX) 0.4 mg capsule 3 Sig: Take 1 Cap by mouth daily (after dinner). Class: Print Route: Oral  
 metoprolol tartrate (LOPRESSOR) 50 mg tablet 3 Sig: Take 1 Tab by mouth every twelve (12) hours. Class: Print Route: Oral  
 atorvastatin (LIPITOR) 80 mg tablet 3 Sig: Take 1 Tab by mouth nightly. Class: Print Route: Oral  
  
Prescriptions Sent to Pharmacy Refills  
 triamcinolone acetonide (KENALOG) 0.1 % topical cream 1 Sig: Apply  to affected area two (2) times a day. use thin layer Class: Normal  
 Pharmacy: [address] [place] [address], [facility] #: [phone] Route: Topical  
  
Follow-up Instructions Return in about 2 months (around 5/13/2018) for medicare well . Patient Instructions Atopic Dermatitis: Care Instructions Your Care Instructions Atopic dermatitis (also called eczema) is a skin problem that causes intense itching and a red, raised rash. In severe cases, the rash develops clear fluid-filled blisters. The rash is not contagious.  People with this condition seem to have very sensitive immune systems that are likely to react to things that cause allergies. The immune system is the body's way of fighting infection. There is no cure for atopic dermatitis, but you may be able to control it with care at home. Follow-up care is a key part of your treatment and safety. Be sure to make and go to all appointments, and call your doctor if you are having problems. It's also a good idea to know your test results and keep a list of the medicines you take. How can you care for yourself at home? · Use moisturizer at least twice a day. · If your doctor prescribes a cream, use it as directed. If your doctor prescribes other medicine, take it exactly as directed. · Wash the affected area with water only. Soap can make dryness and itching worse. Pat dry. · Apply a moisturizer after bathing. Use a cream such as Lubriderm, Moisturel, or Cetaphil that does not irritate the skin or cause a rash. Apply the cream while your skin is still damp after lightly drying with a towel. · Use cold, wet cloths to reduce itching. · Keep cool, and stay out of the sun. · If itching affects your normal activities, an over-the-counter antihistamine, such as diphenhydramine (Benadryl) or loratadine (Claritin) may help. Read and follow all instructions on the label. When should you call for help? Call your doctor now or seek immediate medical care if: 
? · Your rash gets worse and you have a fever. ? · You have new blisters or bruises, or the rash spreads and looks like a sunburn. ? · You have signs of infection, such as: 
¨ Increased pain, swelling, warmth, or redness. ¨ Red streaks leading from the rash. ¨ Pus draining from the rash. ¨ A fever. ? · You have crusting or oozing sores. ? · You have joint aches or body aches along with your rash. ? Watch closely for changes in your health, and be sure to contact your doctor if: ? · Your rash does not clear up after 2 to 3 weeks of home treatment. ? · Itching interferes with your sleep or daily activities. Where can you learn more? Go to http://yvette-karin.info/. Enter J288 in the search box to learn more about \"Atopic Dermatitis: Care Instructions. \" Current as of: October 13, 2016 Content Version: 11.4 © 4785-8278 [employer]. Care instructions adapted under license by [employer] (which disclaims liability or warranty for this information). If you have questions about a medical condition or this instruction, always ask your healthcare professional. [address] [address] any warranty or liability for your use of this information. Introducing [facility]! Yari Rivas introduces [employer] patient portal. Now you can access parts of your medical record, email your doctor's office, and request medication refills online. 1. In your internet browser, go to [email or web address] 2. Click on the First Time User? Click Here link in the Sign In box. You will see the New Member Sign Up page. 3. Enter your [employer] Access Code exactly as it appears below. You will not need to use this code after youve completed the sign-up process. If you do not sign up before the expiration date, you must request a new code. · [employer] Access Code: [email or web address] Expires: 6/11/2018  9:46 AM 
 
4. Enter the last four digits of your Social Security Number (xxxx) and Date of Birth (mm/dd/yyyy) as indicated and click Submit. You will be taken to the next sign-up page. 5. Create a [email or web address] ID. This will be your [employer] login ID and cannot be changed, so think of one that is secure and easy to remember. 6. Create a [employer] password. You can change your password at any time. 7. Enter your Password Reset Question and Answer. This can be used at a later time if you forget your password. 8. Enter your e-mail address. You will receive e-mail notification when new information is available in [address] 9. Click Sign Up. You can now view and download portions of your medical record. 10. Click the Download Summary menu link to download a portable copy of your medical information. If you have questions, please visit the Frequently Asked Questions section of the [employer] website. Remember, [employer] is NOT to be used for urgent needs. For medical emergencies, dial 911. Now available from your iPhone and Android! Please provide this summary of care documentation to your next provider. Your primary care clinician is listed as Leila Hoyt. If you have any questions after today's visit, please call [phone].

## 2018-03-13 NOTE — PATIENT INSTRUCTIONS
Atopic Dermatitis: Care Instructions  Your Care Instructions  Atopic dermatitis (also called eczema) is a skin problem that causes intense itching and a red, raised rash. In severe cases, the rash develops clear fluid-filled blisters. The rash is not contagious. People with this condition seem to have very sensitive immune systems that are likely to react to things that cause allergies. The immune system is the body's way of fighting infection. There is no cure for atopic dermatitis, but you may be able to control it with care at home. Follow-up care is a key part of your treatment and safety. Be sure to make and go to all appointments, and call your doctor if you are having problems. It's also a good idea to know your test results and keep a list of the medicines you take. How can you care for yourself at home? · Use moisturizer at least twice a day. · If your doctor prescribes a cream, use it as directed. If your doctor prescribes other medicine, take it exactly as directed. · Wash the affected area with water only. Soap can make dryness and itching worse. Pat dry. · Apply a moisturizer after bathing. Use a cream such as Lubriderm, Moisturel, or Cetaphil that does not irritate the skin or cause a rash. Apply the cream while your skin is still damp after lightly drying with a towel. · Use cold, wet cloths to reduce itching. · Keep cool, and stay out of the sun. · If itching affects your normal activities, an over-the-counter antihistamine, such as diphenhydramine (Benadryl) or loratadine (Claritin) may help. Read and follow all instructions on the label. When should you call for help? Call your doctor now or seek immediate medical care if:  ? · Your rash gets worse and you have a fever. ? · You have new blisters or bruises, or the rash spreads and looks like a sunburn. ? · You have signs of infection, such as:  ¨ Increased pain, swelling, warmth, or redness.   ¨ Red streaks leading from the rash.  ¨ Pus draining from the rash. ¨ A fever. ? · You have crusting or oozing sores. ? · You have joint aches or body aches along with your rash. ? Watch closely for changes in your health, and be sure to contact your doctor if:  ? · Your rash does not clear up after 2 to 3 weeks of home treatment. ? · Itching interferes with your sleep or daily activities. Where can you learn more? Go to http://yvette-karin.info/. Enter P230 in the search box to learn more about \"Atopic Dermatitis: Care Instructions. \"  Current as of: October 13, 2016  Content Version: 11.4  © 1898-1759 [employer]. Care instructions adapted under license by [employer] (which disclaims liability or warranty for this information). If you have questions about a medical condition or this instruction, always ask your healthcare professional. [address] [address] any warranty or liability for your use of this information.

## 2018-05-15 PROBLEM — E11.21 TYPE 2 DIABETES WITH NEPHROPATHY (HCC): Status: ACTIVE | Noted: 2018-01-01

## 2018-05-15 NOTE — MR AVS SNAPSHOT
[address] [place] [address] 
 
 
 [address] [address] [address] [address] 
[phone] Patient: Laura Burger. MRN: [id number] NJP:[id number] Visit Information Date & Time Provider Department Dept. Phone Encounter #  
 5/15/2018 11:00 AM Tomás Blakely [address] [place] [address] [id number] Follow-up Instructions Return in about 5 months (around 10/15/2018) for dm/htn.  
  
 12/20/2018  1:45 PM  
Any with Rayo Alvarado MD  
[facility] Appt Note: ep- 1 year follow up  
 709 [facility] [address] [facility] 10[address] [facility]  
  
   
 709 [facility] [address] [address] [address] Upcoming Health Maintenance Date Due Pneumococcal 65+ Low/Medium Risk (2 of 2 - PCV13) 7/30/2014 MICROALBUMIN Q1 9/13/2018* EYE EXAM RETINAL OR DILATED Q1 9/14/2018* GLAUCOMA SCREENING Q2Y 9/20/2018* ZOSTER VACCINE AGE 60> 9/20/2018* DTaP/Tdap/Td series (1 - Tdap) 9/21/2018* FOOT EXAM Q1 10/18/2018* Influenza Age 5 to Adult 8/1/2018 LIPID PANEL Q1 8/15/2018 HEMOGLOBIN A1C Q6M 11/15/2018 MEDICARE YEARLY EXAM 5/16/2019 *Topic was postponed. The date shown is not the original due date. Allergies as of 5/15/2018  Review Complete On: 5/15/2018 By: Bassam Ramirez LPN No Known Allergies Current Immunizations  Never Reviewed Name Date Pneumococcal Polysaccharide (PPSV-23) 7/30/2013 11:01 AM  
  
 Not reviewed this visit You Were Diagnosed With   
  
 Codes Comments Medicare annual wellness visit, subsequent    -  Primary ICD-10-CM: Z00.00 ICD-9-CM: V70.0 Type 2 diabetes with nephropathy (HCC)     ICD-10-CM: E11.21 
ICD-9-CM: 250.40, 583.81 Vitals BP Pulse Temp Resp Height(growth percentile) Weight(growth percentile)  108/72 (BP 1 Location: Left arm, BP Patient Position: Sitting) 81 98.2 °F (36.8 °C) (Oral) 16 5' 10\" (1.778 m) 219 lb (99.3 kg) SpO2 BMI Smoking Status 98% 31.42 kg/m2 Never Smoker BMI and BSA Data Body Mass Index Body Surface Area  
 31.42 kg/m 2 2.21 m 2 Your Updated Medication List  
  
   
This list is accurate as of 5/15/18 12:05 PM.  Always use your most recent med list.  
  
  
  
  
 atorvastatin 80 mg tablet Commonly known as:  LIPITOR Take 1 Tab by mouth nightly. metoprolol tartrate 50 mg tablet Commonly known as:  LOPRESSOR Take 1 Tab by mouth every twelve (12) hours. tamsulosin 0.4 mg capsule Commonly known as:  FLOMAX Take 1 Cap by mouth daily (after dinner). triamcinolone acetonide 0.1 % topical cream  
Commonly known as:  KENALOG Apply  to affected area two (2) times a day. use thin layer Prescriptions Printed Refills  
 metoprolol tartrate (LOPRESSOR) 50 mg tablet 3 Sig: Take 1 Tab by mouth every twelve (12) hours. Class: Print Route: Oral  
 atorvastatin (LIPITOR) 80 mg tablet 3 Sig: Take 1 Tab by mouth nightly. Class: Print Route: Oral  
  
We Performed the Following REFERRAL TO OPHTHALMOLOGY [REF57 Custom] Follow-up Instructions Return in about 5 months (around 10/15/2018) for dm/htn. Referral Information Referral ID Referred By Referred To  
  
 [id number] Latrice Workman MD   
   [address] [place] [address] Phone: [phone] Fax: [phone] Visits Status Start Date End Date 1 New Request 5/15/18 5/15/19 If your referral has a status of pending review or denied, additional information will be sent to support the outcome of this decision. Patient Instructions Medicare Wellness Visit, Male The best way to live healthy is to have a healthy lifestyle by eating a well-balanced diet, exercising regularly, limiting alcohol and stopping smoking. Regular physical exams and screening tests are another way to keep healthy. Preventive exams provided by your health care provider can find health problems before they become diseases or illnesses. Preventive services including immunizations, screening tests, monitoring and exams can help you take care of your own health. All people over age 72 should have a pneumovax  and and a prevnar shot to prevent pneumonia. These are once in a lifetime unless you and your provider decide differently. All people over 65 should have a yearly flu shot and a tetanus vaccine every 10 years. Screening for diabetes mellitus with a blood sugar test should be done every year. Glaucoma is a disease of the eye due to increased ocular pressure that can lead to blindness and it should be done every year by an eye professional. 
 
Cardiovascular screening tests that check for elevated lipids (fatty part of blood) which can lead to heart disease and strokes should be done every 5 years. Colorectal screening that evaluates for blood or polyps in your colon should be done yearly as a stool test or every five years as a flexible sigmoidoscope or every 10 years as a colonoscopy up to age 76. Men up to age 76 may need a screening blood test for prostate cancer at certain intervals, depending on their personal and family history. This decision is between the patient and his provider. If you have been a smoker or had family history of abdominal aortic aneurysms, you and your provider may decide to schedule an ultrasound test of your aorta. Hepatitis C screening is also recommended for anyone born between [birth year] through [address]. A shingles vaccine is also recommended once in a lifetime after age 61. Your Medicare Wellness Exam is recommended annually. Here is a list of your current Health Maintenance items with a due date: 
Health Maintenance Due Topic Date Due  
 Diabetic Foot Care  01/18/1943  Albumin Urine Test  01[birth year]18/1943  Eye Exam  01[birth year]18/1943  
 DTaP/Tdap/Td  (1 - Tdap) 01/18/1954  Shingles Vaccine  11/18/1992  Glaucoma Screening   01/18/1998  Hemoglobin A1C    01/23/2014  Pneumococcal Vaccine (2 of 2 - PCV13) 07/30/2014 [employer] Annual Well Visit  03/14/2018 Pneumococcal 13-Valent Vaccine, Diphtheria Conjugate (By injection) Pneumococcal 13-Valent Vaccine, Diphtheria Conjugate (OMB-pnm-QUL-al 13-VAY-lent VAX-een, dif-THEER-ee-a KKD-bou-gnli) Prevents infections, such as pneumonia and meningitis. Brand Name(s): Prevnar 13 There may be other brand names for this medicine. When This Medicine Should Not Be Used: This vaccine is not right for everyone. You should not receive it if you had an allergic reaction to pneumococcal or diphtheria vaccine. How to Use This Medicine:  
Injectable · A nurse or other health provider will give you this medicine. This vaccine is usually given as a shot into a muscle in the thigh or upper arm. · The vaccine schedule is different for different people. ¨ Tell your doctor if your child was born prematurely. Children who were premature may need to follow a different schedule. ¨ Children younger than 10years of age: This vaccine is usually given as 3 or 4 separate shots over several months. Your child's doctor will tell you how many shots are needed and when to come back for the next one. ¨ Children older than 10years of age: This vaccine is given as a single shot. If your child recently received another pneumonia vaccine, this one should be given at least 8 weeks later. ¨ Adults older than 25years of age: This vaccine is given as a single dose. · It is very important for your child to receive all of the shots for the vaccine. · Missed dose: This vaccine must be given on a fixed schedule. If your child misses a dose, call your child's doctor for another appointment. Drugs and Foods to Avoid: Ask your doctor or pharmacist before using any other medicine, including over-the-counter medicines, vitamins, and herbal products. · Some foods and medicines can affect how this vaccine works. Tell your doctor if you are receiving a treatment or medicine that causes a weak immune system. This includes radiation treatment, steroid medicine (including hydrocortisone, methylprednisolone, prednisolone, prednisone), or cancer medicine. Warnings While Using This Medicine: · Tell your doctor if you are pregnant or breastfeeding. · Tell your doctor if you have a weak immune system. You may not be fully protected by this vaccine. Possible Side Effects While Using This Medicine:  
Call your doctor right away if you notice any of these side effects: · Allergic reaction: Itching or hives, swelling in your face or hands, swelling or tingling in your mouth or throat, chest tightness, trouble breathing · High fever If you notice these less serious side effects, talk with your doctor: · Crying, irritability, or fussiness · Joint or muscle pain · Mild skin rash · Pain, burning, redness, or swelling where the shot was given · Poor appetite · Sleep changes If you notice other side effects that you think are caused by this medicine, tell your doctor. Call your doctor for medical advice about side effects. You may report side effects to FDA at 8-544-FDA-8770 © 2017 [facility] Information is for End User's use only and may not be sold, redistributed or otherwise used for commercial purposes. The above information is an  only. It is not intended as medical advice for individual conditions or treatments. Talk to your doctor, nurse or pharmacist before following any medical regimen to see if it is safe and effective for you. Introducing [facility]!    
 Nina Chandler introduces [employer] patient portal. Now you can access parts of your medical record, email your doctor's office, and request medication refills online. 1. In your internet browser, go to [email or web address] 2. Click on the First Time User? Click Here link in the Sign In box. You will see the New Member Sign Up page. 3. Enter your EQU[employer] Access Code exactly as it appears below. You will not need to use this code after youve completed the sign-up process. If you do not sign up before the expiration date, you must request a new code. · EQU[employer] Access Code: [email or web address] Expires: 6/11/2018  9:46 AM 
 
4. Enter the last four digits of your Social Security Number (xxxx) and Date of Birth (mm/dd/yyyy) as indicated and click Submit. You will be taken to the next sign-up page. 5. Create a EQUISO ID. This will be your EQUISO login ID and cannot be changed, so think of one that is secure and easy to remember. 6. Create a EQUISO password. You can change your password at any time. 7. Enter your Password Reset Question and Answer. This can be used at a later time if you forget your password. 8. Enter your e-mail address. You will receive e-mail notification when new information is available in [address] 9. Click Sign Up. You can now view and download portions of your medical record. 10. Click the Download Summary menu link to download a portable copy of your medical information. If you have questions, please visit the Frequently Asked Questions section of the EQU[employer] website. Remember, EQUISO is NOT to be used for urgent needs. For medical emergencies, dial 911. Now available from your iPhone and Android! Please provide this summary of care documentation to your next provider. Your primary care clinician is listed as Swetha Snowball. If you have any questions after today's visit, please call [phone].

## 2018-05-15 NOTE — ACP (ADVANCE CARE PLANNING)
Advance Care Planning (ACP) Provider Conversation Snapshot    Date of ACP Conversation: 05/15/18  Persons included in Conversation:  patient  Length of ACP Conversation in minutes:  <16 minutes (Non-Billable)    Authorized Decision Maker (if patient is incapable of making informed decisions):    This person is:   NA          For Patients with Decision Making Capacity:   TBD    Conversation Outcomes / Follow-Up Plan:   Recommended completion of Advance Directive form after review of ACP materials and conversation with prospective healthcare agent   Recommended communicating the plan and making copies for the healthcare agent, personal physician, and others as appropriate (e.g., health system)

## 2018-05-15 NOTE — PATIENT INSTRUCTIONS
Medicare Wellness Visit, Male    The best way to live healthy is to have a healthy lifestyle by eating a well-balanced diet, exercising regularly, limiting alcohol and stopping smoking. Regular physical exams and screening tests are another way to keep healthy. Preventive exams provided by your health care provider can find health problems before they become diseases or illnesses. Preventive services including immunizations, screening tests, monitoring and exams can help you take care of your own health. All people over age 72 should have a pneumovax  and and a prevnar shot to prevent pneumonia. These are once in a lifetime unless you and your provider decide differently. All people over 65 should have a yearly flu shot and a tetanus vaccine every 10 years. Screening for diabetes mellitus with a blood sugar test should be done every year. Glaucoma is a disease of the eye due to increased ocular pressure that can lead to blindness and it should be done every year by an eye professional.    Cardiovascular screening tests that check for elevated lipids (fatty part of blood) which can lead to heart disease and strokes should be done every 5 years. Colorectal screening that evaluates for blood or polyps in your colon should be done yearly as a stool test or every five years as a flexible sigmoidoscope or every 10 years as a colonoscopy up to age 76. Men up to age 76 may need a screening blood test for prostate cancer at certain intervals, depending on their personal and family history. This decision is between the patient and his provider. If you have been a smoker or had family history of abdominal aortic aneurysms, you and your provider may decide to schedule an ultrasound test of your aorta. Hepatitis C screening is also recommended for anyone born between [birth year] through [address]. A shingles vaccine is also recommended once in a lifetime after age 61.     Your Medicare Wellness Exam is recommended annually. Here is a list of your current Health Maintenance items with a due date:  Health Maintenance Due   Topic Date Due    Diabetic Foot Care  01[birth year]18/1943    Albumin Urine Test  01/18/1943    Eye Exam  01[birth year]18/1943    DTaP/Tdap/Td  (1 - Tdap) 01/18/1954    Shingles Vaccine  11/18/1992    Glaucoma Screening   01/18/1998    Hemoglobin A1C    01/23/2014    Pneumococcal Vaccine (2 of 2 - PCV13) 07/30/2014    Annual Well Visit  03/14/2018     Pneumococcal 13-Valent Vaccine, Diphtheria Conjugate (By injection)   Pneumococcal 13-Valent Vaccine, Diphtheria Conjugate (PDG-qep-UIU-al 13-VAY-lent VAX-een, dif-THEER-ee-a FXA-ira-gsiy)  Prevents infections, such as pneumonia and meningitis. Brand Name(s): Prevnar 13   There may be other brand names for this medicine. When This Medicine Should Not Be Used: This vaccine is not right for everyone. You should not receive it if you had an allergic reaction to pneumococcal or diphtheria vaccine. How to Use This Medicine:   Injectable  · A nurse or other health provider will give you this medicine. This vaccine is usually given as a shot into a muscle in the thigh or upper arm. · The vaccine schedule is different for different people. ¨ Tell your doctor if your child was born prematurely. Children who were premature may need to follow a different schedule. ¨ Children younger than 10years of age: This vaccine is usually given as 3 or 4 separate shots over several months. Your child's doctor will tell you how many shots are needed and when to come back for the next one. ¨ Children older than 10years of age: This vaccine is given as a single shot. If your child recently received another pneumonia vaccine, this one should be given at least 8 weeks later. ¨ Adults older than 25years of age: This vaccine is given as a single dose. · It is very important for your child to receive all of the shots for the vaccine. · Missed dose:  This vaccine must be given on a fixed schedule. If your child misses a dose, call your child's doctor for another appointment. Drugs and Foods to Avoid:   Ask your doctor or pharmacist before using any other medicine, including over-the-counter medicines, vitamins, and herbal products. · Some foods and medicines can affect how this vaccine works. Tell your doctor if you are receiving a treatment or medicine that causes a weak immune system. This includes radiation treatment, steroid medicine (including hydrocortisone, methylprednisolone, prednisolone, prednisone), or cancer medicine. Warnings While Using This Medicine:   · Tell your doctor if you are pregnant or breastfeeding. · Tell your doctor if you have a weak immune system. You may not be fully protected by this vaccine. Possible Side Effects While Using This Medicine:   Call your doctor right away if you notice any of these side effects:  · Allergic reaction: Itching or hives, swelling in your face or hands, swelling or tingling in your mouth or throat, chest tightness, trouble breathing  · High fever  If you notice these less serious side effects, talk with your doctor:   · Crying, irritability, or fussiness  · Joint or muscle pain  · Mild skin rash  · Pain, burning, redness, or swelling where the shot was given  · Poor appetite  · Sleep changes  If you notice other side effects that you think are caused by this medicine, tell your doctor. Call your doctor for medical advice about side effects. You may report side effects to FDA at 1-896-FDA-8540  © 2017 [facility] Information is for End User's use only and may not be sold, redistributed or otherwise used for commercial purposes. The above information is an  only. It is not intended as medical advice for individual conditions or treatments. Talk to your doctor, nurse or pharmacist before following any medical regimen to see if it is safe and effective for you.

## 2018-05-15 NOTE — PROGRESS NOTES
This is the Subsequent Medicare Annual Wellness Exam, performed 12 months or more after the Initial AWV or the last Subsequent AWV    I have reviewed the patient's medical history in detail and updated the computerized patient record. Needs a diabetic foot exam, ophthalmology   Declines a urine microalbumin    He has no new complaints. History     Past Medical History:   Diagnosis Date    DDD (degenerative disc disease), lumbar spine and pelvis     Diabetes ([address]     Heart disease     High blood pressure     Hypercholesteremia     Kidney stone     Renal function impairment       Past Surgical History:   Procedure Laterality Date    HX APPENDECTOMY       Current Outpatient Prescriptions   Medication Sig Dispense Refill    tamsulosin (FLOMAX) 0.4 mg capsule Take 1 Cap by mouth daily (after dinner). 90 Cap 3    metoprolol tartrate (LOPRESSOR) 50 mg tablet Take 1 Tab by mouth every twelve (12) hours. 60 Tab 3    atorvastatin (LIPITOR) 80 mg tablet Take 1 Tab by mouth nightly. 30 Tab 3    triamcinolone acetonide (KENALOG) 0.1 % topical cream Apply  to affected area two (2) times a day. use thin layer 30 g 1     No Known Allergies  History reviewed. No pertinent family history.   Social History   Substance Use Topics    Smoking status: Never Smoker    Smokeless tobacco: Never Used    Alcohol use Yes      Comment: ocassionally     Patient Active Problem List   Diagnosis Code    DDD (degenerative disc disease), lumbar spine and pelvis M51.36    Acute pulmonary embolism ([address] I26.99    H/O asbestos exposure Z77.090    Troponin level elevated R74.8    Chronic kidney disease, stage II (mild) N18.2    Diabetes type 2, controlled ([address] E11.9    Dyslipidemia E78.5    Prostate nodule N40.2    Lower urinary tract symptoms (LUTS) R39.9    Heart disease I51.9    Diabetes ([address] E11.9    High blood pressure I10    Hypercholesteremia E78.00    Kidney stone N20.0    Renal function impairment N28.9    Type 2 diabetes with nephropathy (HCC) E11.21       Depression Risk Factor Screening:     PHQ over the last two weeks 3/13/2018   Little interest or pleasure in doing things Not at all   Feeling down, depressed or hopeless Not at all   Total Score PHQ 2 0     Alcohol Risk Factor Screening: You do not drink alcohol or very rarely. Functional Ability and Level of Safety:   Hearing Loss  Hearing is good. Activities of Daily Living  The home contains: no safety equipment. Patient does total self care    Fall Risk  Fall Risk Assessment, last 12 mths 3/13/2018   Able to walk? Yes   Fall in past 12 months? Yes   Fall with injury? No   Number of falls in past 12 months 1   Fall Risk Score 1       Abuse Screen  Patient is not abused    Cognitive Screening   Evaluation of Cognitive Function:  Has your family/caregiver stated any concerns about your memory: no  Normal    Patient Care Team   Patient Care Team:  Amandeep Barbosa MD as PCP - General (Family Practice)     Visit Vitals    /72 (BP 1 Location: Left arm, BP Patient Position: Sitting)    Pulse 81    Temp 98.2 °F (36.8 °C) (Oral)    Resp 16    Ht 5' 10\" (1.778 m)    Wt 219 lb (99.3 kg)    SpO2 98%    BMI 31.42 kg/m2     General appearance: alert, cooperative, no distress, appears stated age  Neck: supple, symmetrical, trachea midline, no adenopathy, thyroid: not enlarged, symmetric, no tenderness/mass/nodules, no carotid bruit and no JVD  Lungs: clear to auscultation bilaterally  Heart: regular rate and rhythm, S1, S2 normal, no murmur, click, rub or gallop  Extremities: extremities normal, atraumatic, no cyanosis or edema  Sensory exam of the foot is normal, tested with the monofilament. Good pulses, no lesions or ulcers, good peripheral pulses.         Assessment/Plan   Education and counseling provided:  Are appropriate based on today's review and evaluation  End-of-Life planning (with patient's consent)  Cardiovascular screening blood test    Diagnoses and all orders for this visit:    1. Medicare annual wellness visit, subsequent    2. Type 2 diabetes with nephropathy (HCC)  -     REFERRAL TO OPHTHALMOLOGY  -     LIPID PANEL; Future  -     METABOLIC PANEL, BASIC; Future  -     HEMOGLOBIN A1C WITH EAG; Future    3. Encounter for immunization  -     Pneumococcal Conjugate vaccine - 13 valent (50 years and older)  -     Administration fee () for Medicare insured patients    Other orders  -     metoprolol tartrate (LOPRESSOR) 50 mg tablet; Take 1 Tab by mouth every twelve (12) hours. -     atorvastatin (LIPITOR) 80 mg tablet; Take 1 Tab by mouth nightly. There are no preventive care reminders to display for this patient. Follow-up Disposition:  Return in about 5 months (around 10/15/2018) for dm/htn. An After Visit Summary was printed and given to the patient. This has been fully explained to the patient, who indicates understanding.

## 2018-05-15 NOTE — PROGRESS NOTES
1. Have you been to the ER, urgent care clinic since your last visit? Hospitalized since your last visit? No    2. Have you seen or consulted any other health care providers outside of the [address] [place] [address] since your last visit? Include any pap smears or colon screening.  No

## 2018-06-04 NOTE — TELEPHONE ENCOUNTER
[address] calling they received referral for patient on May 16 have tried to call him several times not getting a answer, and voicemail is full

## 2018-08-21 NOTE — PROGRESS NOTES
Subjective:   Catalina Daley is a 80 y.o. male with complaint of low back pain, aching. accompanied by his son. Duration several days  Associated symptoms none  ROS: denies fever, chills, nausea or vomiting, no dysuria no urinary frequency     Current Outpatient Prescriptions   Medication Sig Dispense Refill    metoprolol tartrate (LOPRESSOR) 50 mg tablet Take 1 Tab by mouth every twelve (12) hours. 90 Tab 3    atorvastatin (LIPITOR) 80 mg tablet Take 1 Tab by mouth nightly. 90 Tab 3    tamsulosin (FLOMAX) 0.4 mg capsule Take 1 Cap by mouth daily (after dinner). 90 Cap 3    triamcinolone acetonide (KENALOG) 0.1 % topical cream Apply  to affected area two (2) times a day. use thin layer 30 g 1      PMH: reviewed medications and allergy lists and medical and family history. Lab Results   Component Value Date/Time    GFR est AA >60 05/15/2018 12:23 PM    GFR est non-AA 58 (L) 05/15/2018 12:23 PM    Creatinine 1.19 05/15/2018 12:23 PM    BUN 25 (H) 05/15/2018 12:23 PM    Sodium 141 05/15/2018 12:23 PM    Potassium 4.3 05/15/2018 12:23 PM    Chloride 104 05/15/2018 12:23 PM    CO2 27 05/15/2018 12:23 PM       OBJECTIVE:  Awake and alert in no acute distress  Lungs clear throughout  S1 S2 RRR without ectopy or murmur auscultated. Abdomen: normoactive bowel sounds all quadrants, no tenderness to abdomen upper and lower quadrants.  No hepatosplenomegaly negative CVAT bilaterally  Visit Vitals    /65 (BP 1 Location: Left arm, BP Patient Position: Sitting)    Pulse 82    Temp 98.6 °F (37 °C) (Oral)    Resp 20    Ht 5' 10\" (1.778 m)    Wt 217 lb (98.4 kg)    SpO2 100%    BMI 31.14 kg/m2     Results for orders placed or performed in visit on 08/21/18   AMB POC URINALYSIS DIP STICK AUTO W/O MICRO   Result Value Ref Range    Color (UA POC) Yellow     Clarity (UA POC) Clear     Glucose (UA POC) Negative Negative    Bilirubin (UA POC) Negative Negative    Ketones (UA POC) Negative Negative    Specific gravity (UA POC) 1.015 1.001 - 1.035    Blood (UA POC) Trace Negative    pH (UA POC) 6.5 4.6 - 8.0    Protein (UA POC) Negative Negative    Urobilinogen (UA POC) 2 mg/dL 0.2 - 1    Nitrites (UA POC) Negative Negative    Leukocyte esterase (UA POC) 3+ Negative     Diagnoses and all orders for this visit:    1. Acute cystitis with hematuria  -     CULTURE, URINE; Future  -     trimethoprim-sulfamethoxazole (BACTRIM DS, SEPTRA DS) 160-800 mg per tablet; Take 1 Tab by mouth two (2) times a day for 3 days. 2. Back pain, unspecified back location, unspecified back pain laterality, unspecified chronicity  -     AMB POC URINALYSIS DIP STICK AUTO W/O MICRO      Reviewed risks and benefits and common side effects of new medication  General comfort measures  Case review with Dr. Andrew Marmolejo  I have discussed the diagnosis with the patient and the intended plan as seen in the above orders. The patient has received an after-visit summary and questions were answered concerning future plans. I have discussed medication side effects and warnings with the patient as well. Follow-up Disposition:  Return if symptoms worsen or fail to improve.

## 2018-08-21 NOTE — PATIENT INSTRUCTIONS
Urinary Tract Infections in Men: Care Instructions  Your Care Instructions    A urinary tract infection, or UTI, is a general term for an infection anywhere between the kidneys and the tip of the penis. UTIs can also be a result of a prostate problem. Most cause pain or burning when you urinate. Most UTIs are caused by bacteria and can be cured with antibiotics. It is important to complete your treatment so that the infection does not get worse. Follow-up care is a key part of your treatment and safety. Be sure to make and go to all appointments, and call your doctor if you are having problems. It's also a good idea to know your test results and keep a list of the medicines you take. How can you care for yourself at home? · Take your antibiotics as prescribed. Do not stop taking them just because you feel better. You need to take the full course of antibiotics. · Take your medicines exactly as prescribed. Your doctor may have prescribed a medicine, such as phenazopyridine (Pyridium), to help relieve pain when you urinate. This turns your urine orange. You may stop taking it when your symptoms get better. But be sure to take all of your antibiotics, which treat the infection. · Drink extra water for the next day or two. This will help make the urine less concentrated and help wash out the bacteria causing the infection. (If you have kidney, heart, or liver disease and have to limit your fluids, talk with your doctor before you increase your fluid intake.)  · Avoid drinks that are carbonated or have caffeine. They can irritate the bladder. · Urinate often. Try to empty your bladder each time. · To relieve pain, take a hot bath or lay a heating pad (set on low) over your lower belly or genital area. Never go to sleep with a heating pad in place. To help prevent UTIs  · Drink plenty of fluids, enough so that your urine is light yellow or clear like water.  If you have kidney, heart, or liver disease and have to limit fluids, talk with your doctor before you increase the amount of fluids you drink. · Urinate when you have the urge. Do not hold your urine for a long time. Urinate before you go to sleep. · Keep your penis clean. Catheter care  If you have a drainage tube (catheter) in place, the following steps will help you care for it. · Always wash your hands before and after touching your catheter. · Check the area around the urethra for inflammation or signs of infection. Signs of infection include irritated, swollen, red, or tender skin, or pus around the catheter. · Clean the area around the catheter with soap and water two times a day. Dry with a clean towel afterward. · Do not apply powder or lotion to the skin around the catheter. To empty the urine collection bag  · Wash your hands with soap and water. · Without touching the drain spout, remove the spout from its sleeve at the bottom of the collection bag. Open the valve on the spout. · Let the urine flow out of the bag and into the toilet or a container. Do not let the tubing or drain spout touch anything. · After you empty the bag, clean the end of the drain spout with tissue and water. Close the valve and put the drain spout back into its sleeve at the bottom of the collection bag. · Wash your hands with soap and water. When should you call for help? Call your doctor now or seek immediate medical care if:    · Symptoms such as a fever, chills, nausea, or vomiting get worse or happen for the first time.     · You have new pain in your back just below your rib cage. This is called flank pain.     · There is new blood or pus in your urine.     · You are not able to take or keep down your antibiotics.    Watch closely for changes in your health, and be sure to contact your doctor if:    · You are not getting better after taking an antibiotic for 2 days.     · Your symptoms go away but then come back. Where can you learn more?   Go to http://yvette-karin.info/. Enter U874 in the search box to learn more about \"Urinary Tract Infections in Men: Care Instructions. \"  Current as of: May 12, 2017  Content Version: 11.7  © 1985-4897 [employer]. Care instructions adapted under license by [employer] (which disclaims liability or warranty for this information). If you have questions about a medical condition or this instruction, always ask your healthcare professional. [place] [address] any warranty or liability for your use of this information. Sulfamethoxazole/Trimethoprim (Bactrim, Bactrim DS, SMZ-TMP Pediatric, Septra) - (By mouth)   Why this medicine is used:   Treats or prevents infections. Contact a nurse or doctor right away if you have:  · Severe nausea, vomiting, or stomach pain  · Dark urine or pale stools  · Confusion, weakness  · Severe or bloody diarrhea  · Skin rash, purple spots on your skin, or very pale or yellow skin     Common side effects:  · Mild nausea or vomiting  · Loss of apetite  © 2017 [facility] Information is for End User's use only and may not be sold, redistributed or otherwise used for commercial purposes.

## 2018-08-21 NOTE — MR AVS SNAPSHOT
[address] [facility] 
 
 
 [address] [place], [place] [address] [address] [address] 
[phone] Patient: Shanelle Zayas. MRN: [id number] [facility]:[id number] Visit Information Date & Time Provider Department Dept. Phone Encounter #  
 8/21/2018 10:40 AM KIKE Garica [address] [facility] [id number] Follow-up Instructions Return if symptoms worsen or fail to improve. Your Appointments 10/16/2018 11:00 AM  
Office Visit with MD Emigdio [facility] Appt Note: 5m fu htn  
 [address] [facility] [address] [address]  
[phone]  
  
   
 [address], [place]  
  
    
  
 12/20/2018  1:45 PM  
Any with Gene Valentin MD  
[facility] Appt Note: ep- 1 year follow up  
 [address] [facility] [address] [facility]  
  
   
 [address] [facility] [address] [facility] [address] [place] [address] Upcoming Health Maintenance Date Due MICROALBUMIN Q1 9/13/2018* EYE EXAM RETINAL OR DILATED Q1 9/14/2018* GLAUCOMA SCREENING Q2Y 9/20/2018* ZOSTER VACCINE AGE 60> 9/20/2018* DTaP/Tdap/Td series (1 - Tdap) 9/21/2018* FOOT EXAM Q1 10/18/2018* Influenza Age 5 to Adult 11/21/2018* HEMOGLOBIN A1C Q6M 11/15/2018 LIPID PANEL Q1 5/15/2019 MEDICARE YEARLY EXAM 5/16/2019 *Topic was postponed. The date shown is not the original due date. Allergies as of 8/21/2018  Review Complete On: 8/21/2018 By: Angel Robles NP No Known Allergies Current Immunizations  Never Reviewed Name Date Pneumococcal Conjugate (PCV-13) 5/15/2018 Pneumococcal Polysaccharide (PPSV-23) 7/30/2013 11:01 AM  
  
 Not reviewed this visit You Were Diagnosed With   
  
 Codes Comments Acute cystitis with hematuria    -  Primary ICD-10-CM: N30.01 
ICD-9-CM: 595.0 Back pain, unspecified back location, unspecified back pain laterality, unspecified chronicity     ICD-10-CM: M54.9 ICD-9-CM: 724.5 Vitals BP Pulse Temp Resp Height(growth percentile) Weight(growth percentile) 110/65 (BP 1 Location: Left arm, BP Patient Position: Sitting) 82 98.6 °F (37 °C) (Oral) 20 5' 10\" (1.778 m) 217 lb (98.4 kg) SpO2 BMI Smoking Status 100% 31.14 kg/m2 Never Smoker BMI and BSA Data Body Mass Index Body Surface Area  
 31.14 kg/m 2 2.2 m 2 Preferred Pharmacy Pharmacy Name Phone [id number]- [facility] Sanchez, [address] [place] [address] Your Updated Medication List  
  
   
This list is accurate as of 8/21/18 11:19 AM.  Always use your most recent med list.  
  
  
  
  
 atorvastatin 80 mg tablet Commonly known as:  LIPITOR Take 1 Tab by mouth nightly. metoprolol tartrate 50 mg tablet Commonly known as:  LOPRESSOR Take 1 Tab by mouth every twelve (12) hours. tamsulosin 0.4 mg capsule Commonly known as:  FLOMAX Take 1 Cap by mouth daily (after dinner). triamcinolone acetonide 0.1 % topical cream  
Commonly known as:  KENALOG Apply  to affected area two (2) times a day. use thin layer  
  
 trimethoprim-sulfamethoxazole 160-800 mg per tablet Commonly known as:  BACTRIM DS, SEPTRA DS Take 1 Tab by mouth two (2) times a day for 3 days. Prescriptions Sent to Pharmacy Refills  
 trimethoprim-sulfamethoxazole (BACTRIM DS, SEPTRA DS) 160-800 mg per tablet 0 Sig: Take 1 Tab by mouth two (2) times a day for 3 days. Class: Normal  
 Pharmacy: [facility]/pharmacy #[id number]- [place] Sanchez, [address] [facility] [address] Ph #: [phone] Route: Oral  
  
We Performed the Following AMB POC URINALYSIS DIP STICK AUTO W/O MICRO [33674 CPT(R)] Follow-up Instructions Return if symptoms worsen or fail to improve. To-Do List   
 08/21/2018 Microbiology:  CULTURE, URINE Patient Instructions Urinary Tract Infections in Men: Care Instructions Your Care Instructions A urinary tract infection, or UTI, is a general term for an infection anywhere between the kidneys and the tip of the penis. UTIs can also be a result of a prostate problem. Most cause pain or burning when you urinate. Most UTIs are caused by bacteria and can be cured with antibiotics. It is important to complete your treatment so that the infection does not get worse. Follow-up care is a key part of your treatment and safety. Be sure to make and go to all appointments, and call your doctor if you are having problems. It's also a good idea to know your test results and keep a list of the medicines you take. How can you care for yourself at home? · Take your antibiotics as prescribed. Do not stop taking them just because you feel better. You need to take the full course of antibiotics. · Take your medicines exactly as prescribed. Your doctor may have prescribed a medicine, such as phenazopyridine (Pyridium), to help relieve pain when you urinate. This turns your urine orange. You may stop taking it when your symptoms get better. But be sure to take all of your antibiotics, which treat the infection. · Drink extra water for the next day or two. This will help make the urine less concentrated and help wash out the bacteria causing the infection. (If you have kidney, heart, or liver disease and have to limit your fluids, talk with your doctor before you increase your fluid intake.) · Avoid drinks that are carbonated or have caffeine. They can irritate the bladder. · Urinate often. Try to empty your bladder each time. · To relieve pain, take a hot bath or lay a heating pad (set on low) over your lower belly or genital area. Never go to sleep with a heating pad in place. To help prevent UTIs · Drink plenty of fluids, enough so that your urine is light yellow or clear like water. If you have kidney, heart, or liver disease and have to limit fluids, talk with your doctor before you increase the amount of fluids you drink. · Urinate when you have the urge. Do not hold your urine for a long time. Urinate before you go to sleep. · Keep your penis clean. Catheter care If you have a drainage tube (catheter) in place, the following steps will help you care for it. · Always wash your hands before and after touching your catheter. · Check the area around the urethra for inflammation or signs of infection. Signs of infection include irritated, swollen, red, or tender skin, or pus around the catheter. · Clean the area around the catheter with soap and water two times a day. Dry with a clean towel afterward. · Do not apply powder or lotion to the skin around the catheter. To empty the urine collection bag · Wash your hands with soap and water. · Without touching the drain spout, remove the spout from its sleeve at the bottom of the collection bag. Open the valve on the spout. · Let the urine flow out of the bag and into the toilet or a container. Do not let the tubing or drain spout touch anything. · After you empty the bag, clean the end of the drain spout with tissue and water. Close the valve and put the drain spout back into its sleeve at the bottom of the collection bag. · Wash your hands with soap and water. When should you call for help? Call your doctor now or seek immediate medical care if: 
  · Symptoms such as a fever, chills, nausea, or vomiting get worse or happen for the first time.  
  · You have new pain in your back just below your rib cage. This is called flank pain.  
  · There is new blood or pus in your urine.  
  · You are not able to take or keep down your antibiotics.  
 Watch closely for changes in your health, and be sure to contact your doctor if: 
  · You are not getting better after taking an antibiotic for 2 days.   · Your symptoms go away but then come back. Where can you learn more? Go to http://yvette-karin.info/. Enter C145 in the search box to learn more about \"Urinary Tract Infections in Men: Care Instructions. \" Current as of: May 12, 2017 Content Version: 11.7 © 2945-4682 [employer]. Care instructions adapted under license by [employer] (which disclaims liability or warranty for this information). If you have questions about a medical condition or this instruction, always ask your healthcare professional. [place] [address] any warranty or liability for your use of this information. Sulfamethoxazole/Trimethoprim (Bactrim, Bactrim DS, SMZ-TMP Pediatric, Septra) - (By mouth) Why this medicine is used:  
Treats or prevents infections. Contact a nurse or doctor right away if you have: · Severe nausea, vomiting, or stomach pain · Dark urine or pale stools · Confusion, weakness · Severe or bloody diarrhea 
· Skin rash, purple spots on your skin, or very pale or yellow skin Common side effects: · Mild nausea or vomiting · Loss of apetite © 2017 [address]  Information is for End User's use only and may not be sold, redistributed or otherwise used for commercial purposes. Introducing [facility] & HEALTH SERVICES! [facility] introduces [employer] patient portal. Now you can access parts of your medical record, email your doctor's office, and request medication refills online. 1. In your internet browser, go to [email or web address] 2. Click on the First Time User? Click Here link in the Sign In box. You will see the New Member Sign Up page. 3. Enter your [employer] Access Code exactly as it appears below. You will not need to use this code after youve completed the sign-up process. If you do not sign up before the expiration date, you must request a new code.  
 
· Inn[employer]ra Access Code: [email or web address] 
 Expires: 11/19/2018 11:19 AM 
 
4. Enter the last four digits of your Social Security Number (xxxx) and Date of Birth (mm/dd/yyyy) as indicated and click Submit. You will be taken to the next sign-up page. 5. Create a [employer] ID. This will be your [employer] login ID and cannot be changed, so think of one that is secure and easy to remember. 6. Create a [employer] password. You can change your password at any time. 7. Enter your Password Reset Question and Answer. This can be used at a later time if you forget your password. 8. Enter your e-mail address. You will receive e-mail notification when new information is available in [address] 9. Click Sign Up. You can now view and download portions of your medical record. 10. Click the Download Summary menu link to download a portable copy of your medical information. If you have questions, please visit the Frequently Asked Questions section of the [employer] website. Remember, [employer] is NOT to be used for urgent needs. For medical emergencies, dial 911. Now available from your iPhone and Android! Please provide this summary of care documentation to your next provider. Your primary care clinician is listed as [address]. If you have any questions after today's visit, please call [phone].

## 2018-08-24 NOTE — PROGRESS NOTES
Please call patient and have him stop the Septra DS it is not working on his UTI. Have started Levaquin 250 mg one daily for 3 days.    Savage NAVARRO

## 2018-08-24 NOTE — TELEPHONE ENCOUNTER
Pt's son returned phone call. Pt's son was notified to stop septra and that Levaquin was ready to be picked up from the pharmacy. Pt's son understood directions.

## 2018-08-24 NOTE — TELEPHONE ENCOUNTER
----- Message from Rox Temple NP sent at 8/24/2018  1:30 PM EDT -----  Please call patient and have him stop the Septra DS it is not working on his UTI. Have started Levaquin 250 mg one daily for 3 days.    Lavern Kate NP    Patient Calls            Rox Temple NP routed conversation to You 17 minutes ago (1:29 PM)                            Orders Placed This Encounter            Active        levoFLOXacin (LEVAQUIN) 250 mg tablet Ordered On: 08/24/2018

## 2018-08-24 NOTE — TELEPHONE ENCOUNTER
Spoke with the patient today. Advised the patient that he will need to stop Septra. New antibiotic sent to the pharmacy. Patient verbalized understanding.

## 2018-09-20 NOTE — TELEPHONE ENCOUNTER
Spoke with patient caregiver, Bramwell perm to release on file. States patient has been complaining of intermittent dizziness with postural changes. No chest pain, heart palpitations, shortness of breath noted at this time. If symptoms worsens go to the ER. Appointment has been scheduled.

## 2018-09-21 NOTE — PROGRESS NOTES
HISTORY OF PRESENT ILLNESS  Tobin Blackmon is a 80 y.o. male. HPI Comments: States he has a gnawing in his stomach. Reports he has been having a hard time breathing with exertion per son. Denies coughing. Dizziness    The history is provided by the patient and relative (son). This is a new problem. The current episode started more than 1 week ago (previous episode within the last 2 months ). The problem has not changed since onset. There was no loss of consciousness. The problem is associated with standing up. Associated symptoms include malaise/fatigue, dizziness and light-headedness. Pertinent negatives include no chest pain, no palpitations, no clumsiness, no confusion, no abdominal pain, no headaches, no focal weakness and no slurred speech. He has tried nothing for the symptoms. His past medical history is significant for HTN. His past medical history does not include no CVA or no TIA. No Known Allergies  Current Outpatient Prescriptions   Medication Sig Dispense Refill    metoprolol tartrate (LOPRESSOR) 50 mg tablet Take 1 Tab by mouth every twelve (12) hours. 90 Tab 3    atorvastatin (LIPITOR) 80 mg tablet Take 1 Tab by mouth nightly. 90 Tab 3    tamsulosin (FLOMAX) 0.4 mg capsule Take 1 Cap by mouth daily (after dinner). 90 Cap 3    triamcinolone acetonide (KENALOG) 0.1 % topical cream Apply  to affected area two (2) times a day. use thin layer 30 g 1     Past Medical History:   Diagnosis Date    DDD (degenerative disc disease), lumbar spine and pelvis     Diabetes ([facility] [address]     Heart disease     High blood pressure     Hypercholesteremia     Kidney stone     Renal function impairment       Review of Systems   Constitutional: Positive for malaise/fatigue. Respiratory: Positive for shortness of breath. Negative for cough. Cardiovascular: Negative for chest pain and palpitations. Gastrointestinal: Negative for abdominal pain.    Neurological: Positive for dizziness and light-headedness. Negative for focal weakness and headaches. Psychiatric/Behavioral: Negative for confusion. /81 (BP 1 Location: Left arm, BP Patient Position: Sitting)  Pulse 84  Temp 98.2 °F (36.8 °C) (Oral)   Resp 20  Ht 5' 10\" (1.778 m)  Wt 210 lb (95.3 kg)  SpO2 98%  BMI 30.13 kg/m2    Physical Exam   Constitutional: He appears well-developed and well-nourished. No distress. HENT:   Head: Normocephalic and atraumatic. Neck: Normal range of motion. Neck supple. Cardiovascular: Normal rate, regular rhythm and normal heart sounds. Exam reveals no gallop and no friction rub. No murmur heard. Pulmonary/Chest: Effort normal and breath sounds normal. He has no wheezes. He has no rhonchi. He has no rales. Abdominal: Soft. Bowel sounds are normal. There is tenderness (mild ) in the right lower quadrant. There is CVA tenderness (mild right ). ASSESSMENT and PLAN    ICD-10-CM ICD-9-CM    1. Acute cystitis without hematuria N30.00 595.0 CULTURE, URINE   2. Abdominal pain, unspecified abdominal location R10.9 789.00 AMB POC URINALYSIS DIP STICK AUTO W/O MICRO   3. Dizziness R42 780.4 CBC WITH AUTOMATED DIFF      METABOLIC PANEL, BASIC     Orders Placed This Encounter    CULTURE, URINE    CBC WITH AUTOMATED DIFF    METABOLIC PANEL, BASIC    AMB POC URINALYSIS DIP STICK AUTO W/O MICRO    metoprolol tartrate (LOPRESSOR) 50 mg tablet    levoFLOXacin (LEVAQUIN) 250 mg tablet   alarm signs when to seek emergent care provided and reviewed   I have discussed the diagnosis with the patient and son and the intended plan as seen in the above orders. The patient and son has received an after-visit summary and questions were answered concerning future plans. I have discussed medication side effects and warnings with the patient and son as well. patient and son verbalizes understanding  patient and son agreeable with above plan and verbalizes understanding.   Follow-up Disposition:  Return in about 10 days (around 10/1/2018) for dizziness.

## 2018-09-21 NOTE — MR AVS SNAPSHOT
[address] [facility] [place] 
 
 
 [address] [place], [place] [address] [address] 
[phone] Patient: Carmelita Patten MRN: [id number] Visit Information Date & Time Provider Department Dept. Phone Encounter #  
 9/21/2018  9:00 AM Ra Lozada NP 25[address] [facility] [phone] [id number] Follow-up Instructions Return in about 10 weeks (around 11/30/2018) for dizziness. Your Appointments 10/16/2018 11:00 AM  
Office Visit with MD Carlos Villareal [facility] Appt Note: 5m fu htn  
 [address], [facility] [address] [address]  
[phone]  
  
   
 [address], [place]  
  
    
  
 12/20/2018  1:45 PM  
Any with Alan Huffman MD  
[facility] ([facility] Appt Note: ep- 1 year follow up  
 [address] [facility] [address] [id number] [address]  
  
   
 [address] [facility] [address] [facility],[address] 200 [facility] Upcoming Health Maintenance Date Due MICROALBUMIN Q1 1/18/1943 EYE EXAM RETINAL OR DILATED Q1 1/18/1943 ZOSTER VACCINE AGE 60> 11/18/1992 GLAUCOMA SCREENING Q2Y 1/18/1998 DTaP/Tdap/Td series (1 - Tdap) 9/21/2018* FOOT EXAM Q1 10/18/2018* Influenza Age 5 to Adult 11/21/2018* HEMOGLOBIN A1C Q6M 11/15/2018 LIPID PANEL Q1 5/15/2019 MEDICARE YEARLY EXAM 5/16/2019 *Topic was postponed. The date shown is not the original due date. Allergies as of 9/21/2018  Review Complete On: 9/21/2018 By: Ra Lozada NP No Known Allergies Current Immunizations  Never Reviewed Name Date Pneumococcal Conjugate (PCV-13) 5/15/2018 Pneumococcal Polysaccharide (PPSV-23) 7/30/2013 11:01 AM  
  
 Not reviewed this visit You Were Diagnosed With   
  
 Codes Comments Acute cystitis without hematuria    -  Primary ICD-10-CM: N30.00 ICD-9-CM: 595.0 Abdominal pain, unspecified abdominal location     ICD-10-CM: R10.9 ICD-9-CM: 789.00 Dizziness     ICD-10-CM: R93 ICD-9-CM: 780.4 Vitals BP Pulse Temp Resp Height(growth percentile) Weight(growth percentile) 126/78 (BP 1 Location: Right arm, BP Patient Position: Standing) 84 98.2 °F (36.8 °C) (Oral) 20 5' 10\" (1.778 m) 210 lb (95.3 kg) SpO2 BMI Smoking Status 98% 30.13 kg/m2 Never Smoker Vitals History BMI and BSA Data Body Mass Index Body Surface Area  
 30.13 kg/m 2 2.17 m 2 Preferred Pharmacy Pharmacy Name Phone [facility] #[id number]- [place], [address] [place] [address] Your Updated Medication List  
  
   
This list is accurate as of 9/21/18 10:24 AM.  Always use your most recent med list.  
  
  
  
  
 atorvastatin 80 mg tablet Commonly known as:  LIPITOR Take 1 Tab by mouth nightly. levoFLOXacin 250 mg tablet Commonly known as:  Isa Rakes Take 1 Tab by mouth daily for 3 days. metoprolol tartrate 50 mg tablet Commonly known as:  LOPRESSOR Take 1 Tab by mouth every twelve (12) hours. tamsulosin 0.4 mg capsule Commonly known as:  FLOMAX Take 1 Cap by mouth daily (after dinner). triamcinolone acetonide 0.1 % topical cream  
Commonly known as:  KENALOG Apply  to affected area two (2) times a day. use thin layer Prescriptions Sent to Pharmacy Refills  
 metoprolol tartrate (LOPRESSOR) 50 mg tablet 1 Sig: Take 1 Tab by mouth every twelve (12) hours. Class: Normal  
 Pharmacy: [facility]/pharmacy #[id number], [address] [place] [address] Ph #: [phone] Route: Oral  
 levoFLOXacin (LEVAQUIN) 250 mg tablet 0 Sig: Take 1 Tab by mouth daily for 3 days. Class: Normal  
 Pharmacy: [facility]/pharmacy #[id number]- [place], [address] [place] [address] Ph #: [phone] Route: Oral  
  
We Performed the Following AMB POC URINALYSIS DIP STICK AUTO W/O MICRO [44889 CPT(R)] Follow-up Instructions Return in about 10 weeks (around 11/30/2018) for dizziness. To-Do List   
 09/21/2018 Lab:  CBC WITH AUTOMATED DIFF   
  
 09/21/2018 Microbiology:  CULTURE, URINE   
  
 09/21/2018 Lab:  METABOLIC PANEL, BASIC Patient Instructions Dizziness: Care Instructions Your Care Instructions Dizziness is the feeling of unsteadiness or fuzziness in your head. It is different than having vertigo, which is a feeling that the room is spinning or that you are moving or falling. It is also different from lightheadedness, which is the feeling that you are about to faint. It can be hard to know what causes dizziness. Some people feel dizzy when they have migraine headaches. Sometimes bouts of flu can make you feel dizzy. Some medical conditions, such as heart problems or high blood pressure, can make you feel dizzy. Many medicines can cause dizziness, including medicines for high blood pressure, pain, or anxiety. If a medicine causes your symptoms, your doctor may recommend that you stop or change the medicine. If it is a problem with your heart, you may need medicine to help your heart work better. If there is no clear reason for your symptoms, your doctor may suggest watching and waiting for a while to see if the dizziness goes away on its own. Follow-up care is a key part of your treatment and safety. Be sure to make and go to all appointments, and call your doctor if you are having problems. It's also a good idea to know your test results and keep a list of the medicines you take. How can you care for yourself at home? · If your doctor recommends or prescribes medicine, take it exactly as directed. Call your doctor if you think you are having a problem with your medicine. · Do not drive while you feel dizzy. · Try to prevent falls. Steps you can take include: ¨ Using nonskid mats, adding grab bars near the tub, and using night-lights. ¨ Clearing your home so that walkways are free of anything you might trip on. ¨ Letting family and friends know that you have been feeling dizzy. This will help them know how to help you. When should you call for help? Call 911 anytime you think you may need emergency care. For example, call if: 
  · You passed out (lost consciousness).  
  · You have dizziness along with symptoms of a heart attack. These may include: ¨ Chest pain or pressure, or a strange feeling in the chest. 
¨ Sweating. ¨ Shortness of breath. ¨ Nausea or vomiting. ¨ Pain, pressure, or a strange feeling in the back, neck, jaw, or upper belly or in one or both shoulders or arms. ¨ Lightheadedness or sudden weakness. ¨ A fast or irregular heartbeat.  
  · You have symptoms of a stroke. These may include: 
¨ Sudden numbness, tingling, weakness, or loss of movement in your face, arm, or leg, especially on only one side of your body. ¨ Sudden vision changes. ¨ Sudden trouble speaking. ¨ Sudden confusion or trouble understanding simple statements. ¨ Sudden problems with walking or balance. ¨ A sudden, severe headache that is different from past headaches.  
 Call your doctor now or seek immediate medical care if: 
  · You feel dizzy and have a fever, headache, or ringing in your ears.  
  · You have new or increased nausea and vomiting.  
  · Your dizziness does not go away or comes back.  
 Watch closely for changes in your health, and be sure to contact your doctor if: 
  · You do not get better as expected. Where can you learn more? Go to http://yvette-karin.info/. Enter G520 in the search box to learn more about \"Dizziness: Care Instructions. \" Current as of: November 20, 2017 Content Version: 11.7 © 8528-4370 [employer].  Care instructions adapted under license by [address] (which disclaims liability or warranty for this information). If you have questions about a medical condition or this instruction, always ask your healthcare professional. [address] any warranty or liability for your use of this information. Introducing [facility]! [facility] introduces "[employer] patient portal. Now you can access parts of your medical record, email your doctor's office, and request medication refills online. 1. In your internet browser, go to [email or web address] 2. Click on the First Time User? Click Here link in the Sign In box. You will see the New Member Sign Up page. 3. Enter your "[employer] Access Code exactly as it appears below. You will not need to use this code after youve completed the sign-up process. If you do not sign up before the expiration date, you must request a new code. · "[employer] Access Code: [email or web address] Expires: 11/19/2018 11:19 AM 
 
4. Enter the last four digits of your Social Security Number (xxxx) and Date of Birth (mm/dd/yyyy) as indicated and click Submit. You will be taken to the next sign-up page. 5. Create a "[employer] ID. This will be your "[employer] login ID and cannot be changed, so think of one that is secure and easy to remember. 6. Create a "[employer] password. You can change your password at any time. 7. Enter your Password Reset Question and Answer. This can be used at a later time if you forget your password. 8. Enter your e-mail address. You will receive e-mail notification when new information is available in [address] 9. Click Sign Up. You can now view and download portions of your medical record. 10. Click the Download Summary menu link to download a portable copy of your medical information. If you have questions, please visit the Frequently Asked Questions section of the "[employer] website.  Remember, "[employer] is NOT to be used for urgent needs. For medical emergencies, dial 911. Now available from your iPhone and Android! Please provide this summary of care documentation to your next provider. Your primary care clinician is listed as [address]. If you have any questions after today's visit, please call [phone].

## 2018-09-21 NOTE — PATIENT INSTRUCTIONS
Dizziness: Care Instructions  Your Care Instructions  Dizziness is the feeling of unsteadiness or fuzziness in your head. It is different than having vertigo, which is a feeling that the room is spinning or that you are moving or falling. It is also different from lightheadedness, which is the feeling that you are about to faint. It can be hard to know what causes dizziness. Some people feel dizzy when they have migraine headaches. Sometimes bouts of flu can make you feel dizzy. Some medical conditions, such as heart problems or high blood pressure, can make you feel dizzy. Many medicines can cause dizziness, including medicines for high blood pressure, pain, or anxiety. If a medicine causes your symptoms, your doctor may recommend that you stop or change the medicine. If it is a problem with your heart, you may need medicine to help your heart work better. If there is no clear reason for your symptoms, your doctor may suggest watching and waiting for a while to see if the dizziness goes away on its own. Follow-up care is a key part of your treatment and safety. Be sure to make and go to all appointments, and call your doctor if you are having problems. It's also a good idea to know your test results and keep a list of the medicines you take. How can you care for yourself at home? · If your doctor recommends or prescribes medicine, take it exactly as directed. Call your doctor if you think you are having a problem with your medicine. · Do not drive while you feel dizzy. · Try to prevent falls. Steps you can take include:  ¨ Using nonskid mats, adding grab bars near the tub, and using night-lights. ¨ Clearing your home so that walkways are free of anything you might trip on. ¨ Letting family and friends know that you have been feeling dizzy. This will help them know how to help you. When should you call for help? Call 911 anytime you think you may need emergency care.  For example, call if:    · You passed out (lost consciousness).     · You have dizziness along with symptoms of a heart attack. These may include:  ¨ Chest pain or pressure, or a strange feeling in the chest.  ¨ Sweating. ¨ Shortness of breath. ¨ Nausea or vomiting. ¨ Pain, pressure, or a strange feeling in the back, neck, jaw, or upper belly or in one or both shoulders or arms. ¨ Lightheadedness or sudden weakness. ¨ A fast or irregular heartbeat.     · You have symptoms of a stroke. These may include:  ¨ Sudden numbness, tingling, weakness, or loss of movement in your face, arm, or leg, especially on only one side of your body. ¨ Sudden vision changes. ¨ Sudden trouble speaking. ¨ Sudden confusion or trouble understanding simple statements. ¨ Sudden problems with walking or balance. ¨ A sudden, severe headache that is different from past headaches.    Call your doctor now or seek immediate medical care if:    · You feel dizzy and have a fever, headache, or ringing in your ears.     · You have new or increased nausea and vomiting.     · Your dizziness does not go away or comes back.    Watch closely for changes in your health, and be sure to contact your doctor if:    · You do not get better as expected. Where can you learn more? Go to http://yvette-karin.info/. Enter Z457 in the search box to learn more about \"Dizziness: Care Instructions. \"  Current as of: November 20, 2017  Content Version: 11.7  © 0487-4468 [employer]. Care instructions adapted under license by [employer] (which disclaims liability or warranty for this information). If you have questions about a medical condition or this instruction, always ask your healthcare professional. [place] [address] any warranty or liability for your use of this information.

## 2018-09-21 NOTE — PROGRESS NOTES
Chief Complaint   Patient presents with    Dizziness     1. Have you been to the ER, urgent care clinic since your last visit? Hospitalized since your last visit? No    2. Have you seen or consulted any other health care providers outside of the [address] [place] [address] since your last visit? Include any pap smears or colon screening.  No

## 2018-10-05 NOTE — MR AVS SNAPSHOT
[address] [facility] 
 
 
 [address] [address] 
[phone] Patient: Kevin Andersen. MRN: [id number] Visit Information Date & Time Provider Department Dept. Phone Encounter #  
 10/5/2018  9:40 AM Precious Ames, K[facility] [address] [phone] [id number] Follow-up Instructions Return for keep scheduled appt with Dr. To Faustin on 10/16/18. Your Appointments 10/16/2018 11:00 AM  
Office Visit with Mirian Demarco MD  
[place] [address] [facility] [address] [place] [address]) Appt Note: 5m fu htn  
 [address] [address]  
[phone]  
  
   
 [address]  
  
    
  
 12/20/2018  1:45 PM  
Any with Mariusz Parnell MD  
[facility] ([facility]) Appt Note: ep- 1 year follow up  
 [address] [facility] [address] [address] [address]  
  
   
 [address] [facility] [facility] Upcoming Health Maintenance Date Due MICROALBUMIN Q1 1[birth year]18/1943 EYE EXAM RETINAL OR DILATED Q1 1/18/1943 DTaP/Tdap/Td series (1 - Tdap) 1/18/1954 Shingrix Vaccine Age 50> (1 of 2) 1/18/1983 GLAUCOMA SCREENING Q2Y 1/18/1998 FOOT EXAM Q1 10/18/2018* Influenza Age 5 to Adult 11/21/2018* HEMOGLOBIN A1C Q6M 11/15/2018 LIPID PANEL Q1 5/15/2019 MEDICARE YEARLY EXAM 5/16/2019 *Topic was postponed. The date shown is not the original due date. Allergies as of 10/5/2018  Review Complete On: 10/5/2018 By: Cari Mayo No Known Allergies Current Immunizations  Never Reviewed Name Date Influenza Vaccine (Tri) Adjuvanted 10/5/2018 Pneumococcal Conjugate (PCV-13) 5/15/2018 Pneumococcal Polysaccharide (PPSV-23) 7/30/2013 11:01 AM  
  
 Not reviewed this visit You Were Diagnosed With   
  
 Codes Comments SOB (shortness of breath) on exertion    -  Primary ICD-10-CM: R06.02 
ICD-9-CM: 786.05 History of pulmonary embolism     ICD-10-CM: Z86.711 ICD-9-CM: V12.55   
 H/O asbestos exposure     ICD-10-CM: Z77.090 
ICD-9-CM: V15.84 Encounter for immunization     ICD-10-CM: C61 ICD-9-CM: V03.89 Vitals BP Pulse Temp Resp Height(growth percentile) Weight(growth percentile) 139/68 (BP 1 Location: Left arm, BP Patient Position: Sitting) 76 98.4 °F (36.9 °C) (Oral) 20 5' 10\" (1.778 m) 212 lb (96.2 kg) SpO2 BMI Smoking Status 99% 30.42 kg/m2 Never Smoker BMI and BSA Data Body Mass Index Body Surface Area  
 30.42 kg/m 2 2.18 m 2 Preferred Pharmacy Pharmacy Name Phone [id number]- [address] Your Updated Medication List  
  
   
This list is accurate as of 10/5/18 10:28 AM.  Always use your most recent med list.  
  
  
  
  
 atorvastatin 80 mg tablet Commonly known as:  LIPITOR Take 1 Tab by mouth nightly. metoprolol tartrate 50 mg tablet Commonly known as:  LOPRESSOR Take 1 Tab by mouth every twelve (12) hours. tamsulosin 0.4 mg capsule Commonly known as:  FLOMAX Take 1 Cap by mouth daily (after dinner). triamcinolone acetonide 0.1 % topical cream  
Commonly known as:  KENALOG Apply  to affected area two (2) times a day. use thin layer We Performed the Following INFLUENZA VACCINE INACTIVATED (IIV), SUBUNIT, ADJUVANTED, IM [id number] CPT(R)] REFERRAL TO PULMONARY DISEASE [KCQ80 Custom] Follow-up Instructions Return for keep scheduled appt with Dr. Gabriela Benjamin on 10/16/18. To-Do List   
 10/05/2018 Imaging:  CTA CHEST W OR W WO CONT Referral Information Referral ID Referred By Referred To  
  
 [id number] Christina Cain MD   
   [address] [place] [address], [address] [place] [address] [id number] Phone: [phone] Fax: [phone] Visits Status Start Date End Date 1 New Request 10/5/18 10/5/19 If your referral has a status of pending review or denied, additional information will be sent to support the outcome of this decision. Patient Instructions Shortness of Breath: Care Instructions Your Care Instructions Shortness of breath has many causes. Sometimes conditions such as anxiety can lead to shortness of breath. Some people get mild shortness of breath when they exercise. Trouble breathing also can be a symptom of a serious problem, such as asthma, lung disease, emphysema, heart problems, and pneumonia. If your shortness of breath continues, you may need tests and treatment. Watch for any changes in your breathing and other symptoms. Follow-up care is a key part of your treatment and safety. Be sure to make and go to all appointments, and call your doctor if you are having problems. It's also a good idea to know your test results and keep a list of the medicines you take. How can you care for yourself at home? · Do not smoke or allow others to smoke around you. If you need help quitting, talk to your doctor about stop-smoking programs and medicines. These can increase your chances of quitting for good. · Get plenty of rest and sleep. · Take your medicines exactly as prescribed. Call your doctor if you think you are having a problem with your medicine. · Find healthy ways to deal with stress. ¨ Exercise daily. ¨ Get plenty of sleep. ¨ Eat regularly and well. When should you call for help? Call 911 anytime you think you may need emergency care. For example, call if: 
  · You have severe shortness of breath.  
  · You have symptoms of a heart attack. These may include: ¨ Chest pain or pressure, or a strange feeling in the chest. 
¨ Sweating. ¨ Shortness of breath. ¨ Nausea or vomiting.  
¨ Pain, pressure, or a strange feeling in the back, neck, jaw, or upper belly or in one or both shoulders or arms. ¨ Lightheadedness or sudden weakness. ¨ A fast or irregular heartbeat. After you call 911, the  may tell you to chew 1 adult-strength or 2 to 4 low-dose aspirin. Wait for an ambulance. Do not try to drive yourself.  
 Call your doctor now or seek immediate medical care if: 
  · Your shortness of breath gets worse or you start to wheeze. Wheezing is a high-pitched sound when you breathe.  
  · You wake up at night out of breath or have to prop your head up on several pillows to breathe.  
  · You are short of breath after only light activity or while at rest.  
 Watch closely for changes in your health, and be sure to contact your doctor if: 
  · You do not get better over the next 1 to 2 days. Where can you learn more? Go to http://yvette-karin.info/. Enter S780 in the search box to learn more about \"Shortness of Breath: Care Instructions. \" Current as of: December 6, 2017 Content Version: 11.8 © 2301-4331 [employer]. Care instructions adapted under license by [employer] (which disclaims liability or warranty for this information). If you have questions about a medical condition or this instruction, always ask your healthcare professional. [address] any warranty or liability for your use of this information. Introducing [facility]! [employer] introduces [employer] patient portal. Now you can access parts of your medical record, email your doctor's office, and request medication refills online. 1. In your internet browser, go to [email or web address] 2. Click on the First Time User? Click Here link in the Sign In box. You will see the New Member Sign Up page. 3. Enter your [employer] Access Code exactly as it appears below. You will not need to use this code after youve completed the sign-up process.  If you do not sign up before the expiration date, you must request a new code. · [employer] Access Code: [email or web address] Expires: 11/19/2018 11:19 AM 
 
4. Enter the last four digits of your Social Security Number (xxxx) and Date of Birth (mm/dd/yyyy) as indicated and click Submit. You will be taken to the next sign-up page. 5. Create a [employer] ID. This will be your [employer] login ID and cannot be changed, so think of one that is secure and easy to remember. 6. Create a [employer] password. You can change your password at any time. 7. Enter your Password Reset Question and Answer. This can be used at a later time if you forget your password. 8. Enter your e-mail address. You will receive e-mail notification when new information is available in [address] 9. Click Sign Up. You can now view and download portions of your medical record. 10. Click the Download Summary menu link to download a portable copy of your medical information. If you have questions, please visit the Frequently Asked Questions section of the [employer] website. Remember, [employer] is NOT to be used for urgent needs. For medical emergencies, dial 911. Now available from your iPhone and Android! Please provide this summary of care documentation to your next provider. Your primary care clinician is listed as [address]. If you have any questions after today's visit, please call [phone].

## 2018-10-05 NOTE — PATIENT INSTRUCTIONS

## 2018-10-05 NOTE — PROGRESS NOTES
Chief Complaint   Patient presents with    Dizziness     follow up     1. Have you been to the ER, urgent care clinic since your last visit? Hospitalized since your last visit? No    2. Have you seen or consulted any other health care providers outside of the [address] [place] [address] since your last visit? Include any pap smears or colon screening.  No

## 2018-10-05 NOTE — PROGRESS NOTES
HISTORY OF PRESENT ILLNESS  Ayaan Castellanos is a 80 y.o. male. HPI Comments: Patient states he has sob with exertion. Reports sob has increased. Denies chest pain. He also does continue to have intermittent ruq abd pain. Patient fell last week and reports dizziness prior to falling. States he felt congested. Has been taking xyzal 5 mg with improvement. Denies any further dizziness or falls. Follow-up   The history is provided by the patient and relative (son). Chronicity: following up for dizziness and UTI. Associated symptoms include shortness of breath (with exertion ). Pertinent negatives include no chest pain. No Known Allergies  Current Outpatient Prescriptions   Medication Sig Dispense Refill    metoprolol tartrate (LOPRESSOR) 50 mg tablet Take 1 Tab by mouth every twelve (12) hours. 60 Tab 1    atorvastatin (LIPITOR) 80 mg tablet Take 1 Tab by mouth nightly. 90 Tab 3    tamsulosin (FLOMAX) 0.4 mg capsule Take 1 Cap by mouth daily (after dinner). 90 Cap 3    triamcinolone acetonide (KENALOG) 0.1 % topical cream Apply  to affected area two (2) times a day. use thin layer 30 g 1     Past Medical History:   Diagnosis Date    DDD (degenerative disc disease), lumbar spine and pelvis     Diabetes ([facility] [address]     Heart disease     High blood pressure     Hypercholesteremia     Kidney stone     Renal function impairment      Review of Systems   Constitutional: Negative for chills and fever. Respiratory: Positive for shortness of breath (with exertion ). Cardiovascular: Negative for chest pain and palpitations. /68 (BP 1 Location: Left arm, BP Patient Position: Sitting)  Pulse 76  Temp 98.4 °F (36.9 °C) (Oral)   Resp 20  Ht 5' 10\" (1.778 m)  Wt 212 lb (96.2 kg)  SpO2 99%  BMI 30.42 kg/m2  Physical Exam   Constitutional: He appears well-developed and well-nourished. HENT:   Head: Normocephalic and atraumatic. Right Ear: A middle ear effusion (mild) is present.    Left Ear: A middle ear effusion (mild) is present. Nose: Mucosal edema present. Right sinus exhibits no maxillary sinus tenderness and no frontal sinus tenderness. Left sinus exhibits no maxillary sinus tenderness and no frontal sinus tenderness. Mouth/Throat: Uvula is midline, oropharynx is clear and moist and mucous membranes are normal.   Neck: Normal range of motion. Neck supple. Cardiovascular: Normal rate, regular rhythm and normal heart sounds. Exam reveals no gallop and no friction rub. No murmur heard. Pulmonary/Chest: Effort normal and breath sounds normal. He has no wheezes. He has no rales. Abdominal: Soft. Normal appearance and bowel sounds are normal. He exhibits no distension. There is tenderness in the right upper quadrant. ASSESSMENT and PLAN    ICD-10-CM ICD-9-CM    1. SOB (shortness of breath) on exertion R06.02 786.05 CTA CHEST W OR W WO CONT      REFERRAL TO PULMONARY DISEASE   2. History of pulmonary embolism Z86.711 V12.55 CTA CHEST W OR W WO CONT      REFERRAL TO PULMONARY DISEASE   3. H/O asbestos exposure Z77.090 V15.84 REFERRAL TO PULMONARY DISEASE   4. Encounter for immunization Z23 V03.89 INFLUENZA VACCINE INACTIVATED (IIV), SUBUNIT, ADJUVANTED, IM   I have discussed the diagnosis with the patient and the intended plan as seen in the above orders. The patient has received an after-visit summary and questions were answered concerning future plans. I have discussed medication side effects and warnings with the patient as well. Patient agreeable with above plan and verbalizes understanding. Follow-up Disposition:  Return for keep scheduled appt with Dr. Etelvina Cota on 10/16/18.

## 2018-10-09 NOTE — PROGRESS NOTES
SUBJECTIVE:    Ct Crawford is a 80y.o. year old male is following up for:  CTA results (abnormal Labs). Son and daughter present. Subjective:  Patient was advised to return to discuss his recent CTA results from 10/5/18. Informed of results and questions answered. Discussed needed referrals and additional imaging. Daughter is requesting order for personal wheelchair given patient's limited mobility due to shortness of breath. CT Results (most recent):    Results from Hospital Encounter encounter on 10/05/18   CTA CHEST W OR W WO CONT   Narrative CTA OF THE CHEST, WITH CORONAL AND SAGITTAL REFORMATTED MIP IMAGES, PULMONARY  EMBOLISM PROTOCOL    CPT CODE: 98283    INDICATION: Shortness of breath on exertion. Pain under the right breast x3  months. History of pulmonary embolism. Clinical concern for pulmonary embolism. COMPARISON: 7/22/2013    TECHNIQUE: With IV administration of 80 ml Isovue-370, axial CT images through  the thorax were obtained using helical technique following a pulmonary embolism  protocol. In order more optimally to evaluate the pulmonary arterial tree in  multiplanar CT angiographic fashion, coronal and sagittal reformation maximum  intensity projection (MIP) images were also performed. All CT scans at this  facility are performed using dose optimization technique as appropriate to the  performed exam, to include automated exposure control, adjustment of the mA  and/or kV according to patient's size (Including appropriate matching for  site-specific examinations), or use of iterative reconstruction technique. FINDINGS:    Adequate contrast bolus. The large burden of acute pulmonary embolism described  on the prior CTA in 2013 is no longer seen. Minimal residual findings for  sequela of prior pulmonary embolism, for example minor linear filling defect in  right middle lobe medial segmental branch (series 2 images 115-116).   There is  no convincing evidence of acute pulmonary arterial filling defect in the main,  central, lobar, segmental or enhanced larger subsegmental branches of the  pulmonary arterial tree indicative of acute pulmonary embolism. There are innumerable rounded pulmonary nodules scattered in the lungs  bilaterally, most numerous and largest in the lower lungs, new compared to CT  from 2013, very likely pulmonary metastases. Possibility of  inflammatory/infectious etiology is considered and conceivable, but thought  statistically much less likely. Some of the larger pulmonary nodules include  medial left lower lobe on series 3 image 42 abutting the pleura measuring  approximately 1.3 x 1.2 cm on lung windows, 2 contiguous nodules in the left  lower lobe on images 38-39 each measuring approximately 12 mm, right lower lobe  nodule centrally on image 36 approximately 12 mm. Numerous additional lower lobe  nodules and nodules in the upper lobes bilaterally including the right middle  lobe and lingula. No clear dominant larger lung mass identified to increase  suspicion for a lung primary. Small right pleural effusion with adjacent atelectasis. No evidence of  significant pericardial effusion is seen. No evidence of developing pathologic axillary, mediastinal or hilar lymph node  enlargement is seen. The thoracic aorta enhances normally. Usual cardiac motion artifacts noted. Reflux of contrast is noted inferiorly into the IVC, which can be seen due to  right heart disease. The main pulmonary artery appears mildly prominent in caliber compared to a  sending aorta at the same level, approximately 3.6 cm, which can be seen due to  pulmonary hypertension. The left thyroid lobe appears larger compared to the right and extends farther  inferiorly compared to the right, new compared to the prior chest CT from 2013,  which could reflect left thyroid nodule. Correlation with thyroid ultrasound is  recommended.     In the visualized portion of the upper abdomen, small ascites is evident around  the liver. The liver is suboptimally evaluated due to the early arterial phase  of contrast enhancement used for pulmonary embolism protocol chest CT. However,  there is a large heterogeneous irregular hypoattenuating region centrally in the  liver, new compared to the abdominal CT of 7/23/2013, highly suspicious for  malignancy which could be primary or metastatic. Contours indistinct for  accurate measurement, roughly 8 x 5 cm in transverse dimensions x 6 cm  craniocaudal. About 2 cm rounded hypodensity near the superior posterior margin  of the dominant lesion could be a lobulation of the dominant lesion or a second  adjacent mass. Clinical correlation might be helpful regarding risk factors for  primary versus metastatic liver lesions. If risk factors for primary  hepatocellular carcinoma such as cirrhosis/hepatitis, then dedicated hepatic  protocol MRI might be helpful for further assessment. If risk factors for  metastatic disease such as history of or clinical suspicion for other primary  malignancy, then contrast enhanced abdominopelvic CT might be helpful for  further assessment as clinically warranted. The small subcentimeter  indeterminate hypodensity described in the left hepatic lobe on prior abdominal  CT is without significant interval change, too small to be specifically  characterized, possibly a cyst.    Partially visualized exophytic cysts again seen anteriorly right kidney upper  pole, the larger approximately 3.3 cm, slightly more conspicuous compared to the  abdominal CT from 2013. The partially visualized gallbladder appears partially contracted. Small hiatal hernia. Gastric diverticulum is noted containing air and fluid along the posterior  aspect of the proximal stomach, chronic. Few scattered pancreatic calcifications noted, chronic, possibly due to chronic  pancreatitis regarding which clinical correlation might be helpful.     Mild symmetric gynecomastia incidentally noted. On review of bone windows, there are lucent bone lesions containing striations,  features favorable for hemangiomas, in several thoracic vertebral bodies, most  conspicuous at T8 and T9, likely also in the right side of the T3 vertebral  body. In addition, there are rounded lucencies in the T10 vertebral body without  specific features to indicate hemangioma, not evident on the prior CT,  concerning for osseous metastatic disease. If clinical management will be  affected, MRI might be helpful for further assessment and/or follow-up bone scan  or PET/CT scan depending on histologic diagnosis, as clinically warranted. Several small scattered sclerotic densities are also seen which appear chronic. Impression Impression:      No evidence of acute pulmonary embolism. Interval resolution of previous large  burden of acute pulmonary embolism with very minimal residual sequela of chronic  pulmonary embolism as above. Numerous new scattered bilateral pulmonary nodules, almost certainly metastases. Large heterogeneous irregular hypoattenuating mass in the liver highly  suspicious for malignancy which could be primary or metastatic. Rounded  lobulation versus second adjacent mass. Suboptimally assessed on early arterial  phase of enhancement used for PE protocol chest CT. Dedicated hepatic MRI or  contrast enhanced abdominopelvic CT might be helpful for further assessment as  discussed above. Lucencies in the T10 vertebral body, not evident previously, highly concerning  for osseous metastatic disease. Small ascites in the partially visualized upper abdomen. Small right pleural effusion. Interval enlargement of the left thyroid lobe inferiorly compared to prior  study, concerning for thyroid nodule. Correlation with thyroid ultrasound might  be helpful. Additional findings as described above.             Review of Systems - General ROS: negative  Respiratory ROS: positive for - shortness of breath  Cardiovascular ROS: negative for - chest pain  Gastrointestinal ROS: positive for - abdominal pain    Current Outpatient Prescriptions   Medication Sig Dispense Refill    levocetirizine (XYZAL) 5 mg tablet Take 1 Tab by mouth daily as needed for Allergies. 30 Tab 2    metoprolol tartrate (LOPRESSOR) 50 mg tablet Take 1 Tab by mouth every twelve (12) hours. 60 Tab 1    atorvastatin (LIPITOR) 80 mg tablet Take 1 Tab by mouth nightly. 90 Tab 3    tamsulosin (FLOMAX) 0.4 mg capsule Take 1 Cap by mouth daily (after dinner). 90 Cap 3    triamcinolone acetonide (KENALOG) 0.1 % topical cream Apply  to affected area two (2) times a day. use thin layer 30 g 1     Past Medical History:   Diagnosis Date    DDD (degenerative disc disease), lumbar spine and pelvis     Diabetes ([address]     Heart disease     High blood pressure     Hypercholesteremia     Kidney stone     Renal function impairment      No family history on file. Lab Results   Component Value Date/Time    Cholesterol, total 80 05/15/2018 12:23 PM    HDL Cholesterol 41 05/15/2018 12:23 PM    LDL, calculated 26.4 05/15/2018 12:23 PM    VLDL, calculated 12.6 05/15/2018 12:23 PM    Triglyceride 63 05/15/2018 12:23 PM    CHOL/HDL Ratio 2.0 05/15/2018 12:23 PM     Lab Results   Component Value Date/Time    Sodium 140 09/21/2018 10:27 AM    Potassium 4.5 09/21/2018 10:27 AM    Chloride 105 09/21/2018 10:27 AM    CO2 27 09/21/2018 10:27 AM    Anion gap 8 09/21/2018 10:27 AM    Glucose 94 09/21/2018 10:27 AM    BUN 25 (H) 09/21/2018 10:27 AM    Creatinine 1.07 09/21/2018 10:27 AM    BUN/Creatinine ratio 23 (H) 09/21/2018 10:27 AM    GFR est AA >60 09/21/2018 10:27 AM    GFR est non-AA >60 09/21/2018 10:27 AM    Calcium 9.1 09/21/2018 10:27 AM    Bilirubin, total 0.8 08/15/2017 10:14 AM    AST (SGOT) 13 (L) 08/15/2017 10:14 AM    Alk.  phosphatase 67 08/15/2017 10:14 AM    Protein, total 6.6 08/15/2017 10:14 AM    Albumin 3.5 08/15/2017 10:14 AM    Globulin 3.1 08/15/2017 10:14 AM    A-G Ratio 1.1 08/15/2017 10:14 AM    ALT (SGPT) 21 08/15/2017 10:14 AM     Lab Results   Component Value Date/Time    WBC 7.5 09/21/2018 10:27 AM    HGB 13.6 09/21/2018 10:27 AM    HCT 42.1 09/21/2018 10:27 AM    PLATELET 510 68/05/2379 10:27 AM    MCV 93.6 09/21/2018 10:27 AM     Lab Results   Component Value Date/Time    Hemoglobin A1c 5.8 (H) 05/15/2018 12:23 PM     Wt Readings from Last 3 Encounters:   10/09/18 214 lb 9.6 oz (97.3 kg)   10/05/18 212 lb (96.2 kg)   09/21/18 210 lb (95.3 kg)       Objective:   Visit Vitals    /87 (BP 1 Location: Left arm)    Pulse 81    Temp 98 °F (36.7 °C) (Oral)    Resp 16    Ht 5' 10\" (1.778 m)    Wt 214 lb 9.6 oz (97.3 kg)    SpO2 98%    BMI 30.79 kg/m2     General appearance: alert, cooperative, no distress, appears stated age  Neck: supple, symmetrical, trachea midline, no adenopathy and no carotid bruit  Lungs: clear to auscultation bilaterally  Heart: regular rate and rhythm, S1, S2 normal, no murmur, click, rub or gallop  Abdomen: soft, non-tender. Bowel sounds normal.           Assessment/Plan:    ICD-10-CM ICD-9-CM    1. Abnormal chest CT R93.89 793.2 REFERRAL TO HEMATOLOGY ONCOLOGY   2. Multiple pulmonary nodules R91.8 793.19 REFERRAL TO HEMATOLOGY ONCOLOGY   3. Liver mass R16.0 573.9 REFERRAL TO HEMATOLOGY ONCOLOGY      REFERRAL TO GASTROENTEROLOGY   4. Left thyroid nodule E04.1 241.0 US THYROID/PARATHYROID/SOFT TISS   5. Limited mobility Z74.09 V49.89 Wheel Chair kevin   6. Dyspnea on exertion R06.09 786.09 Wheel Chair kevin   urgent referrals placed as above  I have discussed the diagnosis with the patient and the intended plan as seen in the above orders. The patient has received an after-visit summary and questions were answered concerning future plans. I have discussed medication side effects and warnings with the patient as well.   Patient agreeable with above plan and verbalizes understanding. Follow-up Disposition:  Return if symptoms worsen or fail to improve.

## 2018-10-09 NOTE — PATIENT INSTRUCTIONS
Learning About Lung Nodules  What is a lung nodule? A lung nodule is a growth in the lung. A single nodule surrounded by lung tissue is called a solitary pulmonary nodule. A lung nodule might not cause any symptoms. Your doctor may have found one or more nodules on your lung when you were having a chest X-ray or CT scan. Or it may have been found during a lung cancer screening. A lung nodule may be caused by an old infection or cancer. It might also be a noncancerous growth. Lung nodules can cause a screening to give an abnormal result. Most nodules do not cause any harm. But without further tests, your doctor can't tell whether an abnormal finding is cancer, a harmless nodule, or something else. What can you expect when you have a lung nodule? Your doctor will look at several risk factors to see how likely it is that the nodule is cancer. He or she will look at:  · Whether you smoke or have ever smoked. · Your age and your family's medical history. · Whether you have ever had lung cancer. · The size and shape of the nodule. · Whether the nodule has changed in size. Your doctor may look at past chest X-rays or CT scans, if available, and compare them. Or you may have a series of CT scans to see if the nodule grows over time. What happens next depends on the risk of the nodule being cancer. · If you have no risk factors and the nodule is small, your doctor may advise doing nothing. · If the risk is small, your doctor may schedule follow-up appointments and tests. You may have more CT scans later to see if the nodule is growing. If the nodule hasn't changed in 3 to 6 months, you may have CT scans every year. If it hasn't changed in 2 years, you may not need any more tests. · If there's a higher risk of cancer, your doctor may:  Marbin Elaine a PET scan, which may help tell if the nodule is cancerous or not. ¨ Take a sample of tissue from the nodule for testing. This is called a biopsy.   ¨ Remove the nodule with surgery. Follow-up care is a key part of your treatment and safety. Be sure to make and go to all appointments, and call your doctor if you are having problems. It's also a good idea to know your test results and keep a list of the medicines you take. Where can you learn more? Go to http://yvette-karin.info/. Enter B684 in the search box to learn more about \"Learning About Lung Nodules. \"  Current as of: March 28, 2018  Content Version: 11.8  © 3848-1665 Healthwise, Incorporated. Care instructions adapted under license by [employer] (which disclaims liability or warranty for this information). If you have questions about a medical condition or this instruction, always ask your healthcare professional. [address] any warranty or liability for your use of this information.

## 2018-10-09 NOTE — MR AVS SNAPSHOT
[address] [facility] 
 
 
 [address] [place], [place] [address] [address] 
[phone] Patient: Jordon Jain. MRN: [id number] TRINITY:[birth year] Visit Information Date & Time Provider Department Dept. Phone Encounter #  
 10/9/2018  2:00 PM Nancy Padilla, [address] [phone] [id number] Follow-up Instructions Return if symptoms worsen or fail to improve. Your Appointments 10/16/2018 11:00 AM  
Office Visit with Meir Mir MD  
[place] [address] [facility] [address] [place] [address]) Appt Note: 5m fu htn  
 [address] [facility] [address] [address]  
[phone]  
  
   
 [address], [place]  
  
    
  
 12/20/2018  1:45 PM  
Any with Anna Goldstein MD  
[facility] ([facility]) Appt Note: ep- 1 year follow up  
 [address] [facility] [address] [address] [address]  
  
   
 [address] [facility] [address] [facility] 100 200 [facility] Upcoming Health Maintenance Date Due MICROALBUMIN Q1 1/18/1943 EYE EXAM RETINAL OR DILATED Q1 1/18/1943 DTaP/Tdap/Td series (1 - Tdap) 1/18/1954 Shingrix Vaccine Age 50> (1 of 2) 1/18/1983 GLAUCOMA SCREENING Q2Y 1/18/1998 FOOT EXAM Q1 10/18/2018* HEMOGLOBIN A1C Q6M 11/15/2018 LIPID PANEL Q1 5/15/2019 MEDICARE YEARLY EXAM 5/16/2019 *Topic was postponed. The date shown is not the original due date. Allergies as of 10/9/2018  Review Complete On: 10/9/2018 By: Mandy Henderson LPN No Known Allergies Current Immunizations  Never Reviewed Name Date Influenza Vaccine (Tri) Adjuvanted 10/5/2018 Pneumococcal Conjugate (PCV-13) 5/15/2018 Pneumococcal Polysaccharide (PPSV-23) 7/30/2013 11:01 AM  
  
 Not reviewed this visit You Were Diagnosed With   
  
 Codes Comments Abnormal chest CT    -  Primary ICD-10-CM: R93.89 ICD-9-CM: 793.2 Multiple pulmonary nodules     ICD-10-CM: R91.8 ICD-9-CM: 793.19 Liver mass     ICD-10-CM: R16.0 ICD-9-CM: 573.9 Vitals BP Pulse Temp Resp Height(growth percentile) Weight(growth percentile) 149/87 (BP 1 Location: Left arm) 81 98 °F (36.7 °C) (Oral) 16 5' 10\" (1.778 m) 214 lb 9.6 oz (97.3 kg) SpO2 BMI Smoking Status 98% 30.79 kg/m2 Never Smoker BMI and BSA Data Body Mass Index Body Surface Area 30.79 kg/m 2 2.19 m 2 Preferred Pharmacy Pharmacy Name Phone [id number]- [place]ts, [address] [place] [address] Your Updated Medication List  
  
   
This list is accurate as of 10/9/18  2:44 PM.  Always use your most recent med list.  
  
  
  
  
 atorvastatin 80 mg tablet Commonly known as:  LIPITOR Take 1 Tab by mouth nightly. levocetirizine 5 mg tablet Commonly known as:  Maryjean Feller Take 1 Tab by mouth daily as needed for Allergies. metoprolol tartrate 50 mg tablet Commonly known as:  LOPRESSOR Take 1 Tab by mouth every twelve (12) hours. tamsulosin 0.4 mg capsule Commonly known as:  FLOMAX Take 1 Cap by mouth daily (after dinner). triamcinolone acetonide 0.1 % topical cream  
Commonly known as:  KENALOG Apply  to affected area two (2) times a day. use thin layer Prescriptions Sent to Pharmacy Refills  
 levocetirizine (XYZAL) 5 mg tablet 2 Sig: Take 1 Tab by mouth daily as needed for Allergies. Class: Normal  
 Pharmacy: [id number]- [place] [facility]ts, [address] [place] [address] Ph #: [phone] Route: Oral  
  
We Performed the Following REFERRAL TO GASTROENTEROLOGY [IYO26 Custom] REFERRAL TO HEMATOLOGY ONCOLOGY [UNT62 Custom] Follow-up Instructions Return if symptoms worsen or fail to improve. Referral Information Referral ID Referred By Referred To  
  
 [id number] Shayla Cole MD   
   [address] [place] [address] [address] [place] [address] [address] Phone: [phone] Fax: [phone] Visits Status Start Date End Date 1 New Request 10/9/18 10/9/19 If your referral has a status of pending review or denied, additional information will be sent to support the outcome of this decision. Referral ID Referred By Referred To  
 [id number] Crystal Randle MD  
   [address] [place] [address] [address] [facility] [address], [address] Phone: [phone] Fax: [phone] Visits Status Start Date End Date 1 New Request 10/9/18 10/9/19 If your referral has a status of pending review or denied, additional information will be sent to support the outcome of this decision. Patient Instructions Learning About Lung Nodules What is a lung nodule? A lung nodule is a growth in the lung. A single nodule surrounded by lung tissue is called a solitary pulmonary nodule. A lung nodule might not cause any symptoms. Your doctor may have found one or more nodules on your lung when you were having a chest X-ray or CT scan. Or it may have been found during a lung cancer screening. A lung nodule may be caused by an old infection or cancer. It might also be a noncancerous growth. Lung nodules can cause a screening to give an abnormal result. Most nodules do not cause any harm. But without further tests, your doctor can't tell whether an abnormal finding is cancer, a harmless nodule, or something else. What can you expect when you have a lung nodule? Your doctor will look at several risk factors to see how likely it is that the nodule is cancer. He or she will look at: · Whether you smoke or have ever smoked. · Your age and your family's medical history. · Whether you have ever had lung cancer. · The size and shape of the nodule. · Whether the nodule has changed in size.  Your doctor may look at past chest X-rays or CT scans, if available, and compare them. Or you may have a series of CT scans to see if the nodule grows over time. What happens next depends on the risk of the nodule being cancer. · If you have no risk factors and the nodule is small, your doctor may advise doing nothing. · If the risk is small, your doctor may schedule follow-up appointments and tests. You may have more CT scans later to see if the nodule is growing. If the nodule hasn't changed in 3 to 6 months, you may have CT scans every year. If it hasn't changed in 2 years, you may not need any more tests. · If there's a higher risk of cancer, your doctor may: ¨ Do a PET scan, which may help tell if the nodule is cancerous or not. ¨ Take a sample of tissue from the nodule for testing. This is called a biopsy. ¨ Remove the nodule with surgery. Follow-up care is a key part of your treatment and safety. Be sure to make and go to all appointments, and call your doctor if you are having problems. It's also a good idea to know your test results and keep a list of the medicines you take. Where can you learn more? Go to http://yvette-karin.info/. Enter C378 in the search box to learn more about \"Learning About Lung Nodules. \" Current as of: March 28, 2018 Content Version: 11.8 © 4662-1548 Healthwise, Incorporated. Care instructions adapted under license by [employer] (which disclaims liability or warranty for this information). If you have questions about a medical condition or this instruction, always ask your healthcare professional. [place] [address] any warranty or liability for your use of this information. Introducing \[facility]\"" & HEALTH SERVICES! [facility] introduces [employer] patient portal. Now you can access parts of your medical record, email your doctor's office, and request medication refills online. 1. In your internet browser, go to [email or web address] 2. Click on the First Time User? Click Here link in the Sign In box. You will see the New Member Sign Up page. 3. Enter your [employer] Access Code exactly as it appears below. You will not need to use this code after youve completed the sign-up process. If you do not sign up before the expiration date, you must request a new code. · [employer] Access Code: [email or web address] Expires: 11/19/2018 11:19 AM 
 
4. Enter the last four digits of your Social Security Number (xxxx) and Date of Birth (mm/dd/yyyy) as indicated and click Submit. You will be taken to the next sign-up page. 5. Create a [employer] ID. This will be your [employer] login ID and cannot be changed, so think of one that is secure and easy to remember. 6. Create a [employer] password. You can change your password at any time. 7. Enter your Password Reset Question and Answer. This can be used at a later time if you forget your password. 8. Enter your e-mail address. You will receive e-mail notification when new information is available in [address] 9. Click Sign Up. You can now view and download portions of your medical record. 10. Click the Download Summary menu link to download a portable copy of your medical information. If you have questions, please visit the Frequently Asked Questions section of the [employer] website. Remember, [employer] is NOT to be used for urgent needs. For medical emergencies, dial 911. Now available from your iPhone and Android! Please provide this summary of care documentation to your next provider. Your primary care clinician is listed as [address]. If you have any questions after today's visit, please call [phone].

## 2018-10-09 NOTE — PROGRESS NOTES
Pt is here for to discuss CT results    1. Have you been to the ER, urgent care clinic since your last visit? Hospitalized since your last visit? No    2. Have you seen or consulted any other health care providers outside of the [address] [place] [address] since your last visit? Include any pap smears or colon screening.  No

## 2018-10-09 NOTE — PROGRESS NOTES
Please call and see if patient can schedule a follow up this week to discuss CT results.   Thanks, SHA WilliamsonC

## 2018-10-16 NOTE — PATIENT INSTRUCTIONS

## 2018-10-16 NOTE — PROGRESS NOTES
HISTORY OF PRESENT ILLNESS  Evelia Vela is a 80 y.o. male. HPI  Patient is here today for evaluation and treatment of: Diabetes/Hypertension    Diabetes:   Manages this with diet ;    Lab Results   Component Value Date/Time    Hemoglobin A1c 5.3 10/16/2018 12:31 PM         Hypertension: BP is stable, no refills needed; pt is on lipitor for cholesterol. Last chol labs were stable. Needs refills on metoprolol. Pt is under going eval for a liver mass which was found after presentation of abdominal pain and subsequent CT. He is under the care of GI and hematology /oncology  Has had some continued right Upper quadrant pain; He is eating ; he has no vomiting; No diarrhea,  Some constipation. But he did have a BM today. Current Outpatient Medications:     metoprolol tartrate (LOPRESSOR) 50 mg tablet, Take 1 Tab by mouth every twelve (12) hours. , Disp: 180 Tab, Rfl: 3    levocetirizine (XYZAL) 5 mg tablet, Take 1 Tab by mouth daily as needed for Allergies. , Disp: 30 Tab, Rfl: 2    Wheel Chair kevin, To use as needed ICD-10: Z74.09; R06.09, Disp: 1 Each, Rfl: 0    atorvastatin (LIPITOR) 80 mg tablet, Take 1 Tab by mouth nightly., Disp: 90 Tab, Rfl: 3    tamsulosin (FLOMAX) 0.4 mg capsule, Take 1 Cap by mouth daily (after dinner). , Disp: 90 Cap, Rfl: 3    triamcinolone acetonide (KENALOG) 0.1 % topical cream, Apply  to affected area two (2) times a day. use thin layer, Disp: 30 g, Rfl: 1      PMH,  Meds, Allergies, Family History, Social history reviewed    Review of Systems   Constitutional: Negative for chills and fever. Respiratory: Positive for shortness of breath (some better than previous. ). Gastrointestinal: Negative for blood in stool. Physical Exam   Constitutional: He appears well-developed and well-nourished. No distress. Cardiovascular: Normal rate and regular rhythm. Exam reveals no gallop and no friction rub. No murmur heard.   Pulmonary/Chest: Breath sounds normal. No respiratory distress. He has no wheezes. Abdominal: Bowel sounds are normal. He exhibits no distension. There is tenderness (in right upper quadrant). Musculoskeletal: He exhibits no edema. Nursing note and vitals reviewed. Visit Vitals  /64 (BP 1 Location: Left arm, BP Patient Position: Sitting)   Pulse 84   Temp 98.7 °F (37.1 °C) (Oral)   Resp 24   Ht 5' 10\" (1.778 m)   Wt 214 lb (97.1 kg)   SpO2 99%   BMI 30.71 kg/m²         ASSESSMENT and PLAN    ICD-10-CM ICD-9-CM    1. Type 2 diabetes mellitus with hyperglycemia, without long-term current use of insulin (HCC) E11.65 250.00 MICROALBUMIN, UR, RAND W/ MICROALB/CREAT RATIO     790.29 HEMOGLOBIN A1C WITH EAG   2. Essential hypertension D91 546.4 METABOLIC PANEL, BASIC   3. Liver mass R16.0 573.9        As above,   above all stable unless otherwise noted   treatment plan as listed below  Orders Placed This Encounter    MICROALBUMIN, UR, RAND W/ MICROALB/CREAT RATIO    HEMOGLOBIN A1C WITH EAG    METABOLIC PANEL, BASIC    metoprolol tartrate (LOPRESSOR) 50 mg tablet     Follow-up Disposition:  Return in about 3 months (around 1/16/2019) for htn/dm/. An After Visit Summary was printed and given to the patient. This has been fully explained to the patient, who indicates understanding.

## 2018-10-16 NOTE — MR AVS SNAPSHOT
[facility] 
 
 
 [address], [place] [address] [address] [address] 
[phone] Patient: Margarita King. MRN: [id number] EJN:[id number] Visit Information Date & Time Provider Department Dept. Phone Encounter #  
 10/16/2018 11:00 AM Mark Zamudio [address] [place] [address] [address] [id number] Follow-up Instructions Return in about 3 months (around 1/16/2019) for htn/dm/. Your Appointments 11/15/2018  1:00 PM  
Nurse Visit with [facility] [address] ([facility]) Appt Note: NP appt @ 1:30 w/ SP w/ Dr. Jocy Jay per Dr. Lalitha Hall/SOB, PE, Asbestos exposure [address] [facility], [address] [address]  
[phone]  
  
   
 [address] [place] [address], [address] [place] [address],[address] [facility] [phone]  
  
    
 11/15/2018  1:30 PM  
New Patient with Mirian Watts MD  
[address] ([facility]) Appt Note: NP appt w/ SP @ 1 w/ Dr. Jocy Jay per Dr. Lalitha Hall/SOB, PE, Asbestos exposure [address] [place] [address], [address] [address]  
[phone]  
  
   
 [address] [place] [address], [address] [place] [address],[address] [facility] [phone]  
  
    
  
 12/20/2018  1:45 PM  
Any with Basil Goodpasture, MD  
[facility] ([facility]) Appt Note: ep- 1 year follow up  
 [address] [facility] [address] [id number] [address] [facility]  
  
   
 [address] [facility] [address] [facility],[address] [facility] Upcoming Health Maintenance Date Due MICROALBUMIN Q1 1/18/1943 EYE EXAM RETINAL OR DILATED Q1 1/18/1943 DTaP/Tdap/Td series (1 - Tdap) 1/18/1954 Shingrix Vaccine Age 50> (1 of 2) 1/18/1983 GLAUCOMA SCREENING Q2Y 1/18/1998 HEMOGLOBIN A1C Q6M 11/15/2018 FOOT EXAM Q1 10/18/2018* LIPID PANEL Q1 5/15/2019 *Topic was postponed. The date shown is not the original due date. Allergies as of 10/16/2018  Review Complete On: 10/16/2018 By: Jason Dodge LPN No Known Allergies Current Immunizations  Never Reviewed Name Date Influenza Vaccine (Tri) Adjuvanted 10/5/2018 Pneumococcal Conjugate (PCV-13) 5/15/2018 Pneumococcal Polysaccharide (PPSV-23) 7/30/2013 11:01 AM  
  
 Not reviewed this visit You Were Diagnosed With   
  
 Codes Comments Type 2 diabetes mellitus with hyperglycemia, without long-term current use of insulin (HCC)    -  Primary ICD-10-CM: E11.65 ICD-9-CM: 250.00, 790.29 Essential hypertension     ICD-10-CM: I10 
ICD-9-CM: 401.9 Vitals BP Pulse Temp Resp Height(growth percentile) Weight(growth percentile)  
 118/64 (BP 1 Location: Left arm, BP Patient Position: Sitting) 84 98.7 °F (37.1 °C) (Oral) 24 5' 10\" (1.778 m) 214 lb (97.1 kg) SpO2 BMI Smoking Status 99% 30.71 kg/m2 Never Smoker BMI and BSA Data Body Mass Index Body Surface Area 30.71 kg/m 2 2.19 m 2 Preferred Pharmacy Pharmacy Name Phone [id number] [place], [address] [place] [address] Your Updated Medication List  
  
   
This list is accurate as of 10/16/18 12:23 PM.  Always use your most recent med list.  
  
  
  
  
 atorvastatin 80 mg tablet Commonly known as:  LIPITOR Take 1 Tab by mouth nightly. levocetirizine 5 mg tablet Commonly known as:  Victory Mutters Take 1 Tab by mouth daily as needed for Allergies. metoprolol tartrate 50 mg tablet Commonly known as:  LOPRESSOR Take 1 Tab by mouth every twelve (12) hours. tamsulosin 0.4 mg capsule Commonly known as:  FLOMAX Take 1 Cap by mouth daily (after dinner). triamcinolone acetonide 0.1 % topical cream  
Commonly known as:  KENALOG Apply  to affected area two (2) times a day. use thin layer [address] To use as needed ICD-10: Z74.09; R06.09 Prescriptions Sent to Pharmacy Refills  
 metoprolol tartrate (LOPRESSOR) 50 mg tablet 3 Sig: Take 1 Tab by mouth every twelve (12) hours. Class: Normal  
 Pharmacy: [id number] [place], [address] [place] [address],[address] #: [phone] Route: Oral  
  
Follow-up Instructions Return in about 3 months (around 1/16/2019) for htn/dm/. To-Do List   
 10/16/2018 Lab:  HEMOGLOBIN A1C WITH EAG   
  
 10/16/2018 Lab:  METABOLIC PANEL, BASIC   
  
 10/16/2018 Lab:  MICROALBUMIN, UR, RAND W/ MICROALB/CREAT RATIO   
  
 10/18/2018 12:00 PM  
  Appointment with [facility] CT RM 1 at [facility] RAD CT ([phone]) ORAL CONTRAST PREP   2 [facility] from radiology  no later than 24 hours prior to study date and time. DIET RESTRICTIONS  Nothing to eat 4 hours prior to study Drink plenty of clear liquids May to take meds May drink oral contrast if directed  GENERAL INSTRUCTIONS  If you were given premedications for IV contrast to take prior to having your study, please arrange to have someone drive you to your appointment. If you have had a creatinine level drawn within the past 30 days, please bring most recent results to your appt. MEDICATIONS  Bring a complete list of all medications you are currently taking to include prescriptions, over-the-counter meds, herbals, vitamins & any dietary supplements. RELATED STUDY INFORMATION  Bring any films, CD's, and reports related with you on the day of your exam.  This only includes studies done outside of [address] [place] [address], [facility], and [facility]. QUESTIONS  Notify the CT Department if you have any questions concerning your study. [facility] - [phone] [facility][note 3] [address] [place] [address] - [phone] Patient Instructions Learning About Meal Planning for Diabetes Why plan your meals? Meal planning can be a key part of managing diabetes.  Planning meals and snacks with the right balance of carbohydrate, protein, and fat can help you keep your blood sugar at the target level you set with your doctor. You don't have to eat special foods. You can eat what your family eats, including sweets once in a while. But you do have to pay attention to how often you eat and how much you eat of certain foods. You may want to work with a dietitian or a certified diabetes educator. He or she can give you tips and meal ideas and can answer your questions about meal planning. This health professional can also help you reach a healthy weight if that is one of your goals. What plan is right for you? Your dietitian or diabetes educator may suggest that you start with the plate format or carbohydrate counting. The plate format The plate format is a simple way to help you manage how you eat. You plan meals by learning how much space each food should take on a plate. Using the plate format helps you spread carbohydrate throughout the day. It can make it easier to keep your blood sugar level within your target range. It also helps you see if you're eating healthy portion sizes. To use the plate format, you put non-starchy vegetables on half your plate. Add meat or meat substitutes on one-quarter of the plate. Put a grain or starchy vegetable (such as brown rice or a potato) on the final quarter of the plate. You can add a small piece of fruit and some low-fat or fat-free milk or yogurt, depending on your carbohydrate goal for each meal. 
Here are some tips for using the plate format: · Make sure that you are not using an oversized plate. A 9-inch plate is best. Many restaurants use larger plates. · Get used to using the plate format at home. Then you can use it when you eat out. · Write down your questions about using the plate format. Talk to your doctor, a dietitian, or a diabetes educator about your concerns. Carbohydrate counting With carbohydrate counting, you plan meals based on the amount of carbohydrate in each food. Carbohydrate raises blood sugar higher and more quickly than any other nutrient. It is found in desserts, breads and cereals, and fruit. It's also found in starchy vegetables such as potatoes and corn, grains such as rice and pasta, and milk and yogurt. Spreading carbohydrate throughout the day helps keep your blood sugar levels within your target range. Your daily amount depends on several things, including your weight, how active you are, which diabetes medicines you take, and what your goals are for your blood sugar levels. A registered dietitian or diabetes educator can help you plan how much carbohydrate to include in each meal and snack. A guideline for your daily amount of carbohydrate is: · 45 to 60 grams at each meal. That's about the same as 3 to 4 carbohydrate servings. · 15 to 20 grams at each snack. That's about the same as 1 carbohydrate serving. The Nutrition Facts label on packaged foods tells you how much carbohydrate is in a serving of the food. First, look at the serving size on the food label. Is that the amount you eat in a serving? All of the nutrition information on a food label is based on that serving size. So if you eat more or less than that, you'll need to adjust the other numbers. Total carbohydrate is the next thing you need to look for on the label. If you count carbohydrate servings, one serving of carbohydrate is 15 grams. For foods that don't come with labels, such as fresh fruits and vegetables, you'll need a guide that lists carbohydrate in these foods. Ask your doctor, dietitian, or diabetes educator about books or other nutrition guides you can use. If you take insulin, you need to know how many grams of carbohydrate are in a meal. This lets you know how much rapid-acting insulin to take before you eat.  If you use an insulin pump, you get a constant rate of insulin during the day. So the pump must be programmed at meals to give you extra insulin to cover the rise in blood sugar after meals. When you know how much carbohydrate you will eat, you can take the right amount of insulin. Or, if you always use the same amount of insulin, you need to make sure that you eat the same amount of carbohydrate at meals. If you need more help to understand carbohydrate counting and food labels, ask your doctor, dietitian, or diabetes educator. How do you get started with meal planning? Here are some tips to get started: 
· Plan your meals a week at a time. Don't forget to include snacks too. · Use cookbooks or online recipes to plan several main meals. Plan some quick meals for busy nights. You also can double some recipes that freeze well. Then you can save half for other busy nights when you don't have time to cook. · Make sure you have the ingredients you need for your recipes. If you're running low on basic items, put these items on your shopping list too. · List foods that you use to make breakfasts, lunches, and snacks. List plenty of fruits and vegetables. · Post this list on the refrigerator. Add to it as you think of more things you need. · Take the list to the store to do your weekly shopping. Follow-up care is a key part of your treatment and safety. Be sure to make and go to all appointments, and call your doctor if you are having problems. It's also a good idea to know your test results and keep a list of the medicines you take. Where can you learn more? Go to http://yvette-karin.info/. Lucia Peace in the search box to learn more about \"Learning About Meal Planning for Diabetes. \" Current as of: December 7, 2017 Content Version: 11.8 © 0622-1891 Healthwise, Incorporated. Care instructions adapted under license by [employer] (which disclaims liability or warranty for this information).  If you have questions about a medical condition or this instruction, always ask your healthcare professional. [address]  any warranty or liability for your use of this information. Introducing [facility]! [facility] introduces [employer] patient portal. Now you can access parts of your medical record, email your doctor's office, and request medication refills online. 1. In your internet browser, go to [email or web address] 2. Click on the First Time User? Click Here link in the Sign In box. You will see the New Member Sign Up page. 3. Enter your [employer] Access Code exactly as it appears below. You will not need to use this code after youve completed the sign-up process. If you do not sign up before the expiration date, you must request a new code. · [employer] Access Code: [email or web address] Expires: 2018 11:19 AM 
 
4. Enter the last four digits of your Social Security Number (xxxx) and Date of Birth (mm/dd/yyyy) as indicated and click Submit. You will be taken to the next sign-up page. 5. Create a [employer] ID. This will be your [employer] login ID and cannot be changed, so think of one that is secure and easy to remember. 6. Create a [employer] password. You can change your password at any time. 7. Enter your Password Reset Question and Answer. This can be used at a later time if you forget your password. 8. Enter your e-mail address. You will receive e-mail notification when new information is available in [address] 9. Click Sign Up. You can now view and download portions of your medical record. 10. Click the Download Summary menu link to download a portable copy of your medical information. If you have questions, please visit the Frequently Asked Questions section of the [employer] website. Remember, [employer] is NOT to be used for urgent needs. For medical emergencies, dial 911. Now available from your iPhone and Android! Please provide this summary of care documentation to your next provider. Your primary care clinician is listed as [address]. If you have any questions after today's visit, please call [phone].

## 2018-10-19 NOTE — TELEPHONE ENCOUNTER
Ena from [facility] says she needs the rx to say standard manual next to wheelchair. She said the prescription looks good to her , she said the doctor could just write \"standard manual\" next to it and she could use it. Which ever is the easiest way so that she could send his wheelchair out. Please advise.     [phone] ex [phone] (f)

## 2018-10-24 NOTE — PROGRESS NOTES
TRANSFER - OUT REPORT:    Verbal report given to Beronica Kramer RN(name) on [employer].  being transferred to Phase 2(unit) for routine post - op       Report consisted of patients Situation, Background, Assessment and   Recommendations(SBAR). Information from the following report(s) SBAR, Kardex and MAR was reviewed with the receiving nurse. Lines:   Peripheral IV 10/24/18 Left Hand (Active)   Site Assessment Clean, dry, & intact 10/24/2018 10:05 AM   Phlebitis Assessment 0 10/24/2018 10:05 AM   Infiltration Assessment 0 10/24/2018 10:05 AM   Dressing Status Clean, dry, & intact 10/24/2018 10:05 AM   Dressing Type Tape;Transparent 10/24/2018 10:05 AM   Hub Color/Line Status Pink 10/24/2018 10:05 AM   Action Taken Blood drawn 10/24/2018 10:05 AM   Alcohol Cap Used No 10/24/2018 10:05 AM        Opportunity for questions and clarification was provided.       Patient transported with:   [employer]

## 2018-10-24 NOTE — DISCHARGE INSTRUCTIONS
Percutaneous Liver Biopsy: What to Expect at [facility]  Percutaneous liver biopsy is a procedure to take a tiny sample (biopsy) of your liver tissue. Percutaneous (say \"per-lonnie-KT-scotty-) means \"through the skin. \" The procedure is also called aspiration biopsy or fine-needle aspiration. The tissue sample is looked at under a microscope. Your doctor can look for infection or other liver problems. You may have some pain where the biopsy needle entered your skin (the puncture site). You may also have pain in your shoulder. This is called referred pain. It is caused by pain traveling along a nerve near the biopsy site. The referred pain usually lasts less than 12 hours. You may have a small amount of bleeding from the puncture site. You will need to take it easy at home for 1 or 2 days after the procedure. You will probably be able to return to work and most of your usual activities after that. This care sheet gives you a general idea about how long it will take for you to recover. But each person recovers at a different pace. Follow the steps below to get better as quickly as possible. How can you care for yourself at home? Activity    · Rest when you feel tired. Getting enough sleep will help you recover.     · Try to walk each day. Start by walking a little more than you did the day before. Bit by bit, increase the amount you walk. Walking boosts blood flow and helps prevent pneumonia and constipation.     · Avoid exercises that use your belly muscles and strenuous activities, such as bicycle riding, jogging, weight lifting, or aerobic exercise, for 1 week or until your doctor says it is okay.     · Ask your doctor when you can drive again.     · You will probably need to take 1 or 2 days off from work. It depends on the type of work you do and how you feel.     · You will probably be able to shower the same day as the test, if your doctor says it is okay. Pat the puncture site dry.  Do not take a bath for at least 2 days after the test, or until your doctor tells you it is okay. Diet    · You can eat your normal diet. If your stomach is upset, try bland, low-fat foods like plain rice, broiled chicken, toast, and yogurt.     · Drink plenty of fluids (unless your doctor tells you not to). Medicines    · Your doctor will tell you if and when you can restart your medicines. He or she will also give you instructions about taking any new medicines.     · If you take blood thinners, such as warfarin (Coumadin), clopidogrel (Plavix), or aspirin, be sure to talk to your doctor. He or she will tell you if and when to start taking those medicines again. Make sure that you understand exactly what your doctor wants you to do.     · Be safe with medicines. Take pain medicines exactly as directed. ? If the doctor gave you a prescription medicine for pain, take it as prescribed. ? If you are not taking a prescription pain medicine, take an over-the-counter medicine that your doctor recommends. Read and follow all instructions on the label. ? Do not take aspirin, ibuprofen (Advil, Motrin), naproxen (Aleve), or other nonsteroidal anti-inflammatory drugs (NSAIDs) unless your doctor says it is okay.     · If you think your pain medicine is making you sick to your stomach:  ? Take your medicine after meals (unless your doctor has told you not to). ? Ask your doctor for a different kind of pain medicine.    Care of the puncture site    · Keep a bandage over the puncture site for the first 1 or 2 days. Follow-up care is a key part of your treatment and safety. Be sure to make and go to all appointments, and call your doctor if you are having problems. It's also a good idea to know your test results and keep a list of the medicines you take. When should you call for help? Call 911 anytime you think you may need emergency care.  For example, call if:    · You passed out (lost consciousness).     · You have severe trouble breathing.     · You have sudden chest pain and shortness of breath, or you cough up blood.     · You have severe pain in your chest, shoulder, or belly.    Call your doctor now or seek immediate medical care if:    · You have new or worse shortness of breath.     · Bright red blood has soaked through the bandage over the puncture site.     · You have pain that does not get better after you take your pain medicine.     · You are sick to your stomach or cannot keep fluids down.     · You have a fever, chills, or body aches.     · You have signs of infection, such as:  ? Increased pain, swelling, warmth, or redness. ? Red streaks leading from the puncture site. ? Pus draining from the puncture site. ? A fever.     · You have new or worse pain at the puncture site.     · You have new or worse belly swelling or bloating.     · You have trouble passing urine or stool.     · Your stools are black and tarlike or have streaks of blood.     · You have pale-colored stools along with dark urine and itching.    Watch closely for changes in your health, and be sure to contact your doctor if you have any problems. Where can you learn more? Go to http://yvette-karin.info/. Enter O304 in the search box to learn more about \"Percutaneous Liver Biopsy: What to Expect at Home. \"  Current as of: September 10, 2017  Content Version: 11.8  © 9864-9993 [employer]. Care instructions adapted under license by [employer] (which disclaims liability or warranty for this information). If you have questions about a medical condition or this instruction, always ask your healthcare professional. [place] [address] any warranty or liability for your use of this information.     DISCHARGE SUMMARY from Nurse    PATIENT INSTRUCTIONS:    After general anesthesia or intravenous sedation, for 24 hours or while taking prescription Narcotics:  · Limit your activities  · Do not drive and operate hazardous machinery  · Do not make important personal or business decisions  · Do  not drink alcoholic beverages  · If you have not urinated within 8 hours after discharge, please contact your surgeon on call. Report the following to your surgeon:  · Excessive pain, swelling, redness or odor of or around the surgical area  · Temperature over 100.5  · Nausea and vomiting lasting longer than 4 hours or if unable to take medications  · Any signs of decreased circulation or nerve impairment to extremity: change in color, persistent  numbness, tingling, coldness or increase pain  · Any questions    *  Please give a list of your current medications to your Primary Care Provider. *  Please update this list whenever your medications are discontinued, doses are      changed, or new medications (including over-the-counter products) are added. *  Please carry medication information at all times in case of emergency situations. These are general instructions for a healthy lifestyle:    No smoking/ No tobacco products/ Avoid exposure to second hand smoke  Surgeon General's Warning:  Quitting smoking now greatly reduces serious risk to your health. Obesity, smoking, and sedentary lifestyle greatly increases your risk for illness    A healthy diet, regular physical exercise & weight monitoring are important for maintaining a healthy lifestyle    You may be retaining fluid if you have a history of heart failure or if you experience any of the following symptoms:  Weight gain of 3 pounds or more overnight or 5 pounds in a week, increased swelling in our hands or feet or shortness of breath while lying flat in bed. Please call your doctor as soon as you notice any of these symptoms; do not wait until your next office visit.     Recognize signs and symptoms of STROKE:    F-face looks uneven    A-arms unable to move or move unevenly    S-speech slurred or non-existent    T-time-call 911 as soon as signs and symptoms begin-DO NOT go       Back to bed or wait to see if you get better-TIME IS BRAIN. Warning Signs of HEART ATTACK     Call 911 if you have these symptoms:   Chest discomfort. Most heart attacks involve discomfort in the center of the chest that lasts more than a few minutes, or that goes away and comes back. It can feel like uncomfortable pressure, squeezing, fullness, or pain.  Discomfort in other areas of the upper body. Symptoms can include pain or discomfort in one or both arms, the back, neck, jaw, or stomach.  Shortness of breath with or without chest discomfort.  Other signs may include breaking out in a cold sweat, nausea, or lightheadedness. Don't wait more than five minutes to call 911 - MINUTES MATTER! Fast action can save your life. Calling 911 is almost always the fastest way to get lifesaving treatment. Emergency Medical Services staff can begin treatment when they arrive -- up to an hour sooner than if someone gets to the hospital by car. The discharge information has been reviewed with the patient/daughter. The patient/daughter verbalized understanding. Discharge medications reviewed with the patient/daughter and appropriate educational materials and side effects teaching were provided.   ___________________________________________________________________________________________________________________________________

## 2018-10-24 NOTE — H&P
OUTPATIENT HISTORY AND PHYSICAL      Today 10/24/2018     Indication/Symptoms:   Kevin Werner is a 80 y.o. male with a history of liver lesions who presents for an image-guided liver biopsy with moderate sedation. Patient has been NPO since midnight and takes no blood thinning medications. Current Meds:    Prior to Admission medications    Medication Sig Start Date End Date Taking? Authorizing Provider   metoprolol tartrate (LOPRESSOR) 50 mg tablet Take 1 Tab by mouth every twelve (12) hours. 10/16/18  Yes Lilliam Barba MD   levocetirizine (XYZAL) 5 mg tablet Take 1 Tab by mouth daily as needed for Allergies. 10/9/18  Yes Olene Portland, KIKE   Wheel Chair kevin To use as needed  ICD-10: Z74.09; R06.09 10/9/18  Yes Olene Portland, KIKE   atorvastatin (LIPITOR) 80 mg tablet Take 1 Tab by mouth nightly. 5/15/18  Yes Lilliam Barba MD   tamsulosin (FLOMAX) 0.4 mg capsule Take 1 Cap by mouth daily (after dinner). 3/13/18  Yes Lilliam Barba MD   triamcinolone acetonide (KENALOG) 0.1 % topical cream Apply  to affected area two (2) times a day. use thin layer 3/13/18  Yes Lilliam Barba MD       Allergies:    No Known Allergies    Comorbid Conditions:    Past Medical History:   Diagnosis Date    DDD (degenerative disc disease), lumbar spine and pelvis     Diabetes ([address]     Heart disease     High blood pressure     Hypercholesteremia     Kidney stone     Renal function impairment           Past Surgical History:   Procedure Laterality Date    HX APPENDECTOMY       Data:    Visit Vitals  BP (!) 148/93 (BP 1 Location: Right arm, BP Patient Position: At rest)   Pulse (!) 108   Temp 97.6 °F (36.4 °C)   Ht 5' 11\" (1.803 m)   Wt 96.6 kg (213 lb)   SpO2 98%   BMI 29.71 kg/m²   :  Recent Labs     10/24/18  1001   *     Recent Labs     10/24/18  1001   INR 1.1   APTT 23.5       The H & P and/or progress notes and any available imaging were reviewed.   The risks, indications and possible alternatives to the procedure, including doing nothing, were discussed and informed consent was obtained. Physical Exam:      Mental status:   Alert and oriented. Examination specific to the procedure proposed to be performed and any co morbid conditions:   Mallampati classification 3 ,  ASA 3   Heart:   Tachycardia   Lungs:   Normal respiratory effort. Symmetrical rise and fall of chest    The patient is an appropriate candidate to undergo the planned procedure and sedation.     [place], [place]

## 2018-10-24 NOTE — PROCEDURES
RADIOLOGY POST PROCEDURE NOTE          Preoperative Diagnosis:   Liver mass. Postoperative Diagnosis:  Successful CT guided liver biopsy. :  Sandy Porras    Assistant:  None. Type of Anesthesia: 1% plain lidocaine, moderate sedation. Procedure/Description:  CT guided liver biopsy. Findings:   Patient was placed supine. Lesion was localized with CT. Skin was prepped and draped. CT guide and 18 tamno needle was inserted into the lesion. 3 passes were made. Samples were submitted. Pathology was present for tissue analysis. Estimated blood Loss:  Minimal    Specimen Removed:   Yes, pathology    Complications: None    Condition: Stable    Discharge Plan:  Transferred back to nursing unit for observation.     Ely Reilly MD

## 2018-11-06 NOTE — ED NOTES
I performed a brief evaluation, including history and physical, of the patient here in triage and I have determined that pt will need further treatment and evaluation from the main side ER physician. I have placed initial orders to help in expediting patients care. November 06, 2018 at 1:50 PM - EDYTA Spears Visit Vitals /83 (BP 1 Location: Left arm, BP Patient Position: At rest) Pulse 83 Temp 97.6 °F (36.4 °C) Resp 16 Ht 5' 11\" (1.803 m) Wt 93.9 kg (207 lb) SpO2 100% BMI 28.87 kg/m²

## 2018-11-06 NOTE — ED PROVIDER NOTES
EMERGENCY DEPARTMENT HISTORY AND PHYSICAL EXAM 
 
1:58 PM 
 
 
Date: 11/6/2018 Patient Name: Gloria Hernandez. History of Presenting Illness Chief Complaint Patient presents with  Fall [address] Injury History Provided By: Patient and Patient Family Member Chief Complaint: Eye Injury Duration: 14 Hours Timing:  Acute Location: Right eye Quality: Swollen, Red Severity: Moderate Modifying Factors: Tried Peroxide, Iodine, skin glue Associated Symptoms: denies any other associated signs or symptoms Additional History (Context): Gloria Parker is a 80 y.o. male with PMHx of heart disease, HTN, diabetes, kidney stones  who presents with an acute onset of a moderate right eye injury that happened x 14 hours ago. Pt states he was washing up in the shower when \"everything went dark\" and he fell. During the fall pt injured his right eye, resulting in an eye wound, eye swelling, and eye redness. Pt family member reports that he was awake and talking after the fall. Family member used Peroxide, Iodine, and skin glue on the eye injury. Family member wanted to bring the pt to the ED last night however, pt denied. Pt had an appointment with his Oncologist today and was instructed to come to the ED. He is not on any blood thinners. Denies a headache, facial pain, any pain. Denies any further complaints or symptoms at the moment. PCP: Vermell Cowden, MD 
 
Current Outpatient Medications Medication Sig Dispense Refill  metoprolol tartrate (LOPRESSOR) 50 mg tablet Take 1 Tab by mouth every twelve (12) hours. 180 Tab 3  
 levocetirizine (XYZAL) 5 mg tablet Take 1 Tab by mouth daily as needed for Allergies. 30 Tab 2  Wheel Chair kevin To use as needed ICD-10: Z74.09; R06.09 1 Each 0  
 atorvastatin (LIPITOR) 80 mg tablet Take 1 Tab by mouth nightly. 90 Tab 3  
 tamsulosin (FLOMAX) 0.4 mg capsule Take 1 Cap by mouth daily (after dinner).  90 Cap 3  
  triamcinolone acetonide (KENALOG) 0.1 % topical cream Apply  to affected area two (2) times a day. use thin layer 30 g 1 Past History Past Medical History: 
Past Medical History:  
Diagnosis Date  DDD (degenerative disc disease), lumbar spine and pelvis  Diabetes ([address]  Heart disease  High blood pressure  Hypercholesteremia  Kidney stone  Renal function impairment Past Surgical History: 
Past Surgical History:  
Procedure Laterality Date  HX APPENDECTOMY Family History: 
History reviewed. No pertinent family history. Social History: 
Social History Tobacco Use  Smoking status: Never Smoker  Smokeless tobacco: Never Used Substance Use Topics  Alcohol use: Yes Comment: ocassionally  Drug use: No  
 
 
Allergies: 
No Known Allergies Review of Systems Review of Systems Constitutional: Negative for chills, fatigue and fever. HENT: Negative. Negative for sore throat. Eyes: Positive for redness. Positive for eye swelling Respiratory: Negative for cough and shortness of breath. Cardiovascular: Negative for chest pain and palpitations. Gastrointestinal: Negative for abdominal pain, nausea and vomiting. Genitourinary: Negative for dysuria. Musculoskeletal: Negative. Skin: Positive for wound. Neurological: Negative for dizziness, weakness, light-headedness and headaches. Psychiatric/Behavioral: Negative. All other systems reviewed and are negative. Physical Exam  
 
Visit Vitals /83 (BP 1 Location: Left arm, BP Patient Position: At rest) Pulse 83 Temp 97.6 °F (36.4 °C) Resp 16 Ht 5' 11\" (1.803 m) Wt 93.9 kg (207 lb) SpO2 100% BMI 28.87 kg/m² Physical Exam  
Constitutional: He is oriented to person, place, and time. He appears well-developed and well-nourished. HENT:  
Head: Normocephalic and atraumatic. Mouth/Throat: Oropharynx is clear and moist. Oropharyngeal exudate:    
Eyes: Conjunctivae and EOM are normal. Pupils are equal, round, and reactive to light. No scleral icterus. Positive for right periorbital contusion and hematoma with moderate swelling and ecchymosis. EOM intact. sclera is normal and anterior chambers are normal.  
Neck: Normal range of motion. Neck supple. No JVD present. No tracheal deviation present. Cardiovascular: Normal rate, regular rhythm and normal heart sounds. Pulmonary/Chest: Effort normal and breath sounds normal. No respiratory distress. He has no wheezes. Abdominal: Soft. Bowel sounds are normal.  
Musculoskeletal: Normal range of motion. Neurological: He is alert and oriented to person, place, and time. He has normal strength. Gait normal. GCS eye subscore is 4. GCS verbal subscore is 5. GCS motor subscore is 6. Skin: Skin is warm and dry. Positive for 3.5 cm right eyebrow laceration, cleaned and closed with OTC skin glue. Site is clean with no evidence of infection. Psychiatric: He has a normal mood and affect. Nursing note and vitals reviewed. Diagnostic Study Results Labs - No results found for this or any previous visit (from the past 12 hour(s)). Radiologic Studies -  
CT MAXILLOFACIAL WO CONT Final Result CT HEAD WO CONT Final Result CT Maxillofacial  
 IMPRESSION:  
 
1. No CT evidence of acute orbital or facial injury. 2. Minimal mucosal thickening in axillar sinuses. CT Head IMPRESSION: 
 
No acute intracranial abnormality. Microvascular disease. Medical Decision Making I am the first provider for this patient. I reviewed the vital signs, available nursing notes, past medical history, past surgical history, family history and social history. Vital Signs-Reviewed the patient's vital signs.  
 
Pulse Oximetry Analysis - 100 % on RA, normal  
 
 Records Reviewed: Nursing Notes and Old Medical Records (Time of Review: 1:58 PM) ED Course: Progress Notes, Reevaluation, and Consults: 
 
Provider Notes (Medical Decision Making): PT and family understand and agree with dispo and F/U plan. Blaise Johns MD 
 
 
Diagnosis Clinical Impression: 1. Facial contusion, initial encounter 2. Laceration of forehead, subsequent encounter Disposition: discharged. Follow-up Information None Medication List  
  
ASK your doctor about these medications   
atorvastatin 80 mg tablet Commonly known as:  LIPITOR Take 1 Tab by mouth nightly. levocetirizine 5 mg tablet Commonly known as:  Rockney Marcelino Take 1 Tab by mouth daily as needed for Allergies. metoprolol tartrate 50 mg tablet Commonly known as:  LOPRESSOR Take 1 Tab by mouth every twelve (12) hours. tamsulosin 0.4 mg capsule Commonly known as:  FLOMAX Take 1 Cap by mouth daily (after dinner). triamcinolone acetonide 0.1 % topical cream 
Commonly known as:  KENALOG Apply  to affected area two (2) times a day. use thin layer [address] To use as needed ICD-10: Z74.09; R06.09 
  
  
 
_______________________________ Scribe Attestation Roland Park (Aj) acting as a scribe for and in the presence of Dorenda Oppenheim, MD     
November 06, 2018 at 1:58 PM 
    
Provider Attestation:     
I personally performed the services described in the documentation, reviewed the documentation, as recorded by the scribe in my presence, and it accurately and completely records my words and actions. November 06, 2018 at 1:58 PM - Dorenda Oppenheim, MD   
 
 
_______________________________

## 2018-11-06 NOTE — ED TRIAGE NOTES
Daughter of patient states that patient experienced an unwitnessed fall last night at approximately 2300. Patient states: \"All of a sudden I seemed to fall\". Redness noted to right eyelid.

## 2018-11-06 NOTE — DISCHARGE INSTRUCTIONS
Cuts: Care Instructions  Your Care Instructions  A cut can happen anywhere on your body. Stitches, staples, skin adhesives, or pieces of tape called Steri-Strips are sometimes used to keep the edges of a cut together and help it heal. Steri-Strips can be used by themselves or with stitches or staples. Sometimes cuts are left open. If the cut went deep and through the skin, the doctor may have closed the cut in two layers. A deeper layer of stitches brings the deep part of the cut together. These stitches will dissolve and don't need to be removed. The upper layer closure, which could be stitches, staples, Steri-Strips, or adhesive, is what you see on the cut. A cut is often covered by a bandage. The doctor has checked you carefully, but problems can develop later. If you notice any problems or new symptoms, get medical treatment right away. Follow-up care is a key part of your treatment and safety. Be sure to make and go to all appointments, and call your doctor if you are having problems. It's also a good idea to know your test results and keep a list of the medicines you take. How can you care for yourself at home? If a cut is open or closed  · Prop up the sore area on a pillow anytime you sit or lie down during the next 3 days. Try to keep it above the level of your heart. This will help reduce swelling. · Keep the cut dry for the first 24 to 48 hours. After this, you can shower if your doctor okays it. Pat the cut dry. · Don't soak the cut, such as in a bathtub. Your doctor will tell you when it's safe to get the cut wet. · After the first 24 to 48 hours, clean the cut with soap and water 2 times a day unless your doctor gives you different instructions. ? Don't use hydrogen peroxide or alcohol, which can slow healing. ? You may cover the cut with a thin layer of petroleum jelly and a nonstick bandage.   ? If the doctor put a bandage over the cut, put on a new bandage after cleaning the cut or if the bandage gets wet or dirty. · Avoid any activity that could cause your cut to reopen. · Be safe with medicines. Read and follow all instructions on the label. ? If the doctor gave you a prescription medicine for pain, take it as prescribed. ? If you are not taking a prescription pain medicine, ask your doctor if you can take an over-the-counter medicine. If the cut is closed with stitches, staples, or Steri-Strips  · Follow the above instructions for open or closed cuts. · Do not remove the stitches or staples on your own. Your doctor will tell you when to come back to have the stitches or staples removed. · Leave Steri-Strips on until they fall off. If the cut is closed with a skin adhesive  · Follow the above instructions for open or closed cuts. · Leave the skin adhesive on your skin until it falls off on its own. This may take 5 to 10 days. · Do not scratch, rub, or pick at the adhesive. · Do not put the sticky part of a bandage directly on the adhesive. · Do not put any kind of ointment, cream, or lotion over the area. This can make the adhesive fall off too soon. Do not use hydrogen peroxide or alcohol, which can slow healing. When should you call for help? Call your doctor now or seek immediate medical care if:    · You have new pain, or your pain gets worse.     · The skin near the cut is cold or pale or changes color.     · You have tingling, weakness, or numbness near the cut.     · The cut starts to bleed, and blood soaks through the bandage. Oozing small amounts of blood is normal.     · You have trouble moving the area near the cut.     · You have symptoms of infection, such as:  ? Increased pain, swelling, warmth, or redness around the cut.  ? Red streaks leading from the cut.  ? Pus draining from the cut.  ? A fever.    Watch closely for changes in your health, and be sure to contact your doctor if:    · The cut reopens.     · You do not get better as expected.    Where can you learn more? Go to http://yvette-karin.info/. Enter M735 in the search box to learn more about \"Cuts: Care Instructions. \"  Current as of: November 20, 2017  Content Version: 11.8  © 3795-2561 [employer]. Care instructions adapted under license by [employer] (which disclaims liability or warranty for this information). If you have questions about a medical condition or this instruction, always ask your healthcare professional. [address] any warranty or liability for your use of this information.

## 2018-11-15 NOTE — PROGRESS NOTES
Chief Complaint Patient presents with  Asbestosis CT abd/pel done 10/18, CTA done 10/5  Shortness of Breath 1. Have you been to the ER, urgent care clinic since your last visit? Hospitalized since your last visit? Yes Where: [facility]. Have you seen or consulted any other health care providers outside of the [address] [place] [address] since your last visit? Include any pap smears or colon screening. Yes Where: [facility]

## 2018-11-15 NOTE — PROGRESS NOTES
Verbal Order with read back per Ileana Mas MD  For PFT smart panel. AMB POC PFT complete w/ bronchodilator AMB POC PFT complete w/o bronchodilator Dr. Sintia Castillo MD will co-sign the orders.

## 2018-11-15 NOTE — PROGRESS NOTES
[address] [facility], [address] [facility] [address] 
[phone] [facility] Pulmonary, Critical Care, and Sleep Medicine Pulmonary Office Initial Consultation Name: Shantel Hair. 80 y.o. male MRN: [id number] : 1933 Service Date: 11/15/18 Referring Provider: Carlos Barrera NP 
[address] [facility] [address] [place], [address] [place], [place] [address]y [address] Chief Complaint:  
Chief Complaint Patient presents with  Asbestosis CT abd/pel done 10/18, CTA done 10/5  Shortness of Breath History of Present Illness:  Pt accompanied by son and daughter who provide almost the entirety of the history Shantel Vo is a 80 y.o. male, who presents to Pulmonary clinic referred for dyspnea and lung nodules. Pt's daughter reports that pt is very dyspneic even with minimal exertion - occurs within a few feet of ambulation or with slight exertion. She was inquiring about supplemental oxygen. She reports when he becomes SOB \"his chest is heaving hard\". Only alleviated with rest.  She reports that it progressively worsened to this level of severity back in August.  She reports dyspnea started about 6-7 months ago - one year ago he was aysmptomatic, able to get around the store without issues. No other modifying factors. Pt was seen by his PCP who ordered CTA chest to rule out PCP and evaluate for dyspnea. Pt was found to have multiple B/L pulm nodules as well as a liver mass. 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 Pt established with Oncology - Dr. Lucille Altamirano. Pt started chemotherapy this past Tuesday. Pt's daughter reports pt gets lightheaded when standing and blacks out and fell once - last time was about 1.5 weeks ago. No angina/chest pain No cough No sputum Some orthopnea - about 2 pillow LE swelling towards the end of days managed with compression stockings and foot elevation No night sweats, N/V/D, abd pain, voice hoarseness, diplopia. 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  
Diagnosis Date  DDD (degenerative disc disease), lumbar spine and pelvis  Diabetes ([address]  Diabetes mellitus ([address]  Heart disease  High blood pressure  Hypercholesteremia  Kidney stone  Renal function impairment Past Surgical Hx: I have personally reviewed surgical hx Past Surgical History:  
Procedure Laterality Date  HX APPENDECTOMY  HX OTHER ARTIFICIAL OPENING  10/2018  
 liver biopsy Family Hx: I have personally reviewed the family hx. History reviewed. No pertinent family history. Social Hx: I have personally reviewed the social hx. Social History Socioeconomic History  Marital status:  Spouse name: Not on file  Number of children: Not on file  Years of education: Not on file  Highest education level: Not on file Social Needs  Financial resource strain: Not on file  Food insecurity - worry: Not on file  Food insecurity - inability: Not on file  Transportation needs - medical: Not on file  Transportation needs - non-medical: Not on file Occupational History  Not on file Tobacco Use  Smoking status: Never Smoker  Smokeless tobacco: Never Used Substance and Sexual Activity  Alcohol use: Yes Comment: ocassionally  Drug use: No  
 Sexual activity: Not on file Other Topics Concern  Not on file Social History Narrative  Not on file Allergies: I have reviewed the allergy hx No Known Allergies Medications:  I have reviewed the patient's medications Prior to Admission medications Medication Sig Start Date End Date Taking? Authorizing Provider HYDROcodone-acetaminophen (NORCO) 5-325 mg per tablet TAKE 1 TABLET BY MOUTH EVERY 8 HOURS AS NEEDED FOR PAIN 10/31/18  Yes Provider, Historical  
promethazine (PHENERGAN) 12.5 mg tablet TAKE 1 TABLET BY MOUTH EVERY 4 TO 6 HOURS AS NEEDED FOR NAUSEA/VOMITING 11/6/18  Yes Provider, Historical  
ondansetron (ZOFRAN ODT) 8 mg disintegrating tablet DISSOLVE 1 TABLET IN TONGUE 3 TIMES PER DAY AS NEEDED FOR NAUSEA AND VOMITING. 11/6/18  Yes Provider, Historical  
metoprolol tartrate (LOPRESSOR) 50 mg tablet Take 1 Tab by mouth every twelve (12) hours. 10/16/18  Yes Nneka Zavaleta MD  
levocetirizine (XYZAL) 5 mg tablet Take 1 Tab by mouth daily as needed for Allergies. 10/9/18  Yes Neeraj Jackson NP Wheel Chair kevin To use as needed ICD-10: Z74.09; R06.09 10/9/18  Yes Neeraj Jackson NP  
atorvastatin (LIPITOR) 80 mg tablet Take 1 Tab by mouth nightly. 5/15/18  Yes Nneka Zavaleta MD  
tamsulosin (FLOMAX) 0.4 mg capsule Take 1 Cap by mouth daily (after dinner). 3/13/18  Yes Nneka Zavaleta MD  
triamcinolone acetonide (KENALOG) 0.1 % topical cream Apply  to affected area two (2) times a day. use thin layer 3/13/18  Yes Nneka Zavaleta MD  
 
 
Immunizations:  I have reviewed the patient's immunizations Immunization History Administered Date(s) Administered  Influenza Vaccine (Tri) Adjuvanted 10/05/2018  Pneumococcal Conjugate (PCV-13) 05/15/2018  Pneumococcal Polysaccharide (PPSV-23) 07/30/2013 Review of Systems: A complete review of systems was performed as stated in the HPI, all others are negative. Objective: 
 
Physical Exam: 
/80 (BP 1 Location: Left arm, BP Patient Position: At rest)   Pulse 72   Temp 97.5 °F (36.4 °C) (Oral)   Resp 22   Ht 5' 11\" (1.803 m)   Wt 93.9 kg (207 lb)   SpO2 98%   BMI 28.87 kg/m² Vitals were personally reviewed Gen: no acute distress, pleasant and cooperative, sitting up in wheelchair, frail HEENT: normocephalic/atraumatic, PERRLA, EOMI, no scleral icterus, no nasal drainage or tenderness, no oral lesions, good dentition, Mallampati IV Neck: supple, trachea midline, no JVD, no cervical and supraclavicular adenopathy Chest: no lesions, with even rise and fall, no pectus excavatum or flail chest 
CVS: regular rate rhythm, S1/S2, no murmurs/rubs/gallops Lungs: good air entry B/L, CTABL, no wheezes/rales/rhonchi Back: no scoliosis, mild kyphosis Abdomen: soft, nontender, bowel sounds present, no hepatosplenomegaly Ext: trace pitting edema B/L, no peripheral cyanosis or clubbing Neuro: grossly normal, AAOx3, globally weak but good  strength and individual strength Skin: no rashes, erythema, lesions Psych: poor memory, normal thought content, and processing Labs: I have reviewed the patient's available labs Lab Results Component Value Date/Time WBC 7.5 10/24/2018 10:01 AM  
 HGB 13.4 10/24/2018 10:01 AM  
 HCT 39.0 10/24/2018 10:01 AM  
 PLATELET 001 (L) 60/23/1043 10:01 AM  
 MCV 88.0 10/24/2018 10:01 AM  
 
Lab Results Component Value Date/Time Sodium 144 10/24/2018 10:27 AM  
 Potassium 3.8 10/24/2018 10:27 AM  
 Chloride 109 (H) 10/24/2018 10:27 AM  
 CO2 27 10/24/2018 10:27 AM  
 Anion gap 8 10/24/2018 10:27 AM  
 Glucose 97 10/24/2018 10:27 AM  
 BUN 16 10/24/2018 10:27 AM  
 Creatinine 1.06 10/24/2018 10:27 AM  
 BUN/Creatinine ratio 15 10/24/2018 10:27 AM  
 GFR est AA >60 10/24/2018 10:27 AM  
 GFR est non-AA >60 10/24/2018 10:27 AM  
 Calcium 8.1 (L) 10/24/2018 10:27 AM  
 Bilirubin, total 0.6 10/11/2018 01:28 PM  
 AST (SGOT) 19 10/11/2018 01:28 PM  
 Alk.  phosphatase 89 10/11/2018 01:28 PM  
 Protein, total 6.6 10/11/2018 01:28 PM  
 Albumin 3.4 10/11/2018 01:28 PM  
 Globulin 3.2 10/11/2018 01:28 PM  
 A-G Ratio 1.1 10/11/2018 01:28 PM  
 ALT (SGPT) 28 10/11/2018 01:28 PM  
 
 
Imaging:  I have personally reviewed patient's imaging as follows - CTA chest from 10/5/18 show B/L pulmonary nodules in a perilymphatic pattern worse in the lower lobes consistent with metastatic disease. No primary lung tumor seen. No effusions or consolidations Official report per Radiology CTA OF THE CHEST, WITH CORONAL AND SAGITTAL REFORMATTED MIP IMAGES, PULMONARY 
EMBOLISM PROTOCOL 
  
CPT CODE: 66635 
  
INDICATION: Shortness of breath on exertion. Pain under the right breast x3 
months. History of pulmonary embolism. Clinical concern for pulmonary embolism. 
  
COMPARISON: 7/22/2013 
  
TECHNIQUE: With IV administration of 80 ml Isovue-370, axial CT images through 
the thorax were obtained using helical technique following a pulmonary embolism 
protocol. In order more optimally to evaluate the pulmonary arterial tree in 
multiplanar CT angiographic fashion, coronal and sagittal reformation maximum 
intensity projection (MIP) images were also performed. All CT scans at this 
facility are performed using dose optimization technique as appropriate to the 
performed exam, to include automated exposure control, adjustment of the mA 
and/or kV according to patient's size (Including appropriate matching for 
site-specific examinations), or use of iterative reconstruction technique. 
  
FINDINGS: 
  
Adequate contrast bolus. The large burden of acute pulmonary embolism described 
on the prior CTA in 2013 is no longer seen. Minimal residual findings for 
sequela of prior pulmonary embolism, for example minor linear filling defect in 
right middle lobe medial segmental branch (series 2 images 115-116).   There is 
no convincing evidence of acute pulmonary arterial filling defect in the main, 
central, lobar, segmental or enhanced larger subsegmental branches of the 
pulmonary arterial tree indicative of acute pulmonary embolism. 
  
There are innumerable rounded pulmonary nodules scattered in the lungs 
bilaterally, most numerous and largest in the lower lungs, new compared to CT 
 from 2013, very likely pulmonary metastases. Possibility of 
inflammatory/infectious etiology is considered and conceivable, but thought 
statistically much less likely. Some of the larger pulmonary nodules include 
medial left lower lobe on series 3 image 42 abutting the pleura measuring 
approximately 1.3 x 1.2 cm on lung windows, 2 contiguous nodules in the left 
lower lobe on images 38-39 each measuring approximately 12 mm, right lower lobe 
nodule centrally on image 36 approximately 12 mm. Numerous additional lower lobe 
nodules and nodules in the upper lobes bilaterally including the right middle 
lobe and lingula. No clear dominant larger lung mass identified to increase 
suspicion for a lung primary. 
  
Small right pleural effusion with adjacent atelectasis. No evidence of 
significant pericardial effusion is seen. 
  
No evidence of developing pathologic axillary, mediastinal or hilar lymph node 
enlargement is seen.  
  
The thoracic aorta enhances normally. Usual cardiac motion artifacts noted. 
  
Reflux of contrast is noted inferiorly into the IVC, which can be seen due to 
right heart disease. 
  
The main pulmonary artery appears mildly prominent in caliber compared to a 
sending aorta at the same level, approximately 3.6 cm, which can be seen due to 
pulmonary hypertension. 
  
The left thyroid lobe appears larger compared to the right and extends farther 
inferiorly compared to the right, new compared to the prior chest CT from 2013, 
which could reflect left thyroid nodule. Correlation with thyroid ultrasound is 
recommended. 
  
In the visualized portion of the upper abdomen, small ascites is evident around 
the liver. The liver is suboptimally evaluated due to the early arterial phase 
of contrast enhancement used for pulmonary embolism protocol chest CT.  However, 
there is a large heterogeneous irregular hypoattenuating region centrally in the 
 liver, new compared to the abdominal CT of 7/23/2013, highly suspicious for 
malignancy which could be primary or metastatic. Contours indistinct for 
accurate measurement, roughly 8 x 5 cm in transverse dimensions x 6 cm 
craniocaudal. About 2 cm rounded hypodensity near the superior posterior margin 
of the dominant lesion could be a lobulation of the dominant lesion or a second 
adjacent mass. Clinical correlation might be helpful regarding risk factors for 
primary versus metastatic liver lesions. If risk factors for primary 
hepatocellular carcinoma such as cirrhosis/hepatitis, then dedicated hepatic 
protocol MRI might be helpful for further assessment. If risk factors for 
metastatic disease such as history of or clinical suspicion for other primary 
malignancy, then contrast enhanced abdominopelvic CT might be helpful for 
further assessment as clinically warranted.  The small subcentimeter 
indeterminate hypodensity described in the left hepatic lobe on prior abdominal 
CT is without significant interval change, too small to be specifically 
characterized, possibly a cyst. 
  
Partially visualized exophytic cysts again seen anteriorly right kidney upper 
pole, the larger approximately 3.3 cm, slightly more conspicuous compared to the 
abdominal CT from 2013. 
  
The partially visualized gallbladder appears partially contracted. 
  
Small hiatal hernia. 
  
Gastric diverticulum is noted containing air and fluid along the posterior 
aspect of the proximal stomach, chronic. 
  
Few scattered pancreatic calcifications noted, chronic, possibly due to chronic 
pancreatitis regarding which clinical correlation might be helpful. 
  
Mild symmetric gynecomastia incidentally noted. 
  
On review of bone windows, there are lucent bone lesions containing striations, 
features favorable for hemangiomas, in several thoracic vertebral bodies, most 
conspicuous at T8 and T9, likely also in the right side of the T3 vertebral 
body. In addition, there are rounded lucencies in the T10 vertebral body without 
specific features to indicate hemangioma, not evident on the prior CT, 
concerning for osseous metastatic disease. If clinical management will be 
affected, MRI might be helpful for further assessment and/or follow-up bone scan 
or PET/CT scan depending on histologic diagnosis, as clinically warranted. Several small scattered sclerotic densities are also seen which appear chronic. 
  
IMPRESSION Impression:   
  
No evidence of acute pulmonary embolism. Interval resolution of previous large 
burden of acute pulmonary embolism with very minimal residual sequela of chronic 
pulmonary embolism as above. 
  
Numerous new scattered bilateral pulmonary nodules, almost certainly metastases. 
  
Large heterogeneous irregular hypoattenuating mass in the liver highly 
suspicious for malignancy which could be primary or metastatic. Rounded 
lobulation versus second adjacent mass. Suboptimally assessed on early arterial 
phase of enhancement used for PE protocol chest CT. Dedicated hepatic MRI or 
contrast enhanced abdominopelvic CT might be helpful for further assessment as 
discussed above. 
  
Lucencies in the T10 vertebral body, not evident previously, highly concerning 
for osseous metastatic disease. 
  
Small ascites in the partially visualized upper abdomen. 
  
Small right pleural effusion. 
  
Interval enlargement of the left thyroid lobe inferiorly compared to prior 
study, concerning for thyroid nodule. Correlation with thyroid ultrasound might 
be helpful. 
  
Additional findings as described above. 
  
 
Path Reports:  I have personally reviewed the pathology report from liver biopsy from 10/24/18 - shows adenocarcinoma of a GI primary Official report per Pathology PFTs:  I have reviewed the patient's PFTs from today - poor technique and effort, anita suggested restrictive defect without BD response TTE:  I have reviewed the patient's TTE results Results for orders placed or performed during the hospital encounter of 13  
2D ECHO COMPLETE ADULT (TTE)     Status: None Narrative [address], [address] [place], [address] 
[phone] Transthoracic Echocardiogram 
 
Patient: Juliette Wheeler 
MRN: [id number] ACCT #: [de-identified] : 1933 Age: [de-identified] years Gender: Male Height: 70 in Weight: 259.4 lb 
BSA: 2.33 m squared Study date: 2013 BP: 117 / 74 mmHg Status: Routine Location: Echo lab [address] [facility] ACC #: [id number] Allergies: NO KNOWN ALLERGIES Ordering Physician: Mynor Guevara PA-C Referring:  Betsy Dao. Leora Okeefe MD 
Interpreting Group:  [address] [place] [address] Interpreting Physician:  Matilda Pringle MD 
Technologist:  Miguel Angel Pond. Cas Pittman Referring:  Yovani Ponce DO IMPRESSIONS: 
The findings suggest the possibility of pulmonary embolism. The findings 
are consistent with significant right ventricular dysfunction. SUMMARY: 
Left ventricle: Size was normal. Systolic function was normal. Ejection 
fraction was estimated in the range of 60 % to 65 %. There were no 
regional wall motion abnormalities. Wall thickness was mildly to 
moderately increased. Doppler parameters were consistent with abnormal 
left ventricular relaxation (grade 1 diastolic dysfunction). Right ventricle: The ventricle was dilated. Systolic function was 
moderately reduced. Systolic pressure was mildly to moderately increased. Estimated peak pressure was 45 mmHg. Right atrium: The atrium was dilated. Tricuspid valve: There was mild to moderate regurgitation. Inferior vena cava, hepatic veins: Not visualized INDICATIONS: Assess left ventricular function. Abnormal EKG/Elevated 
troponin. HISTORY: Prior history: Acute pulmonary embolus/Asbestos exposure/Chronic 
kidney disease Risk factors: insulin-controlled diabetes. PROCEDURE: This was a routine study. The study included complete 2D 
imaging, M-mode, complete spectral Doppler, and color Doppler. The heart 
rate was 72 bpm, at the start of the study. Systolic blood pressure was 117 mmHg, at the start of the study. Diastolic blood pressure was 74 mmHg, 
at the start of the study. This was a technically difficult study. LEFT VENTRICLE: Size was normal. Systolic function was normal. Ejection 
fraction was estimated in the range of 60 % to 65 %. There were no 
regional wall motion abnormalities. Wall thickness was mildly to 
moderately increased. DOPPLER: Doppler parameters were consistent with 
abnormal left ventricular relaxation (grade 1 diastolic dysfunction). RIGHT VENTRICLE: The ventricle was dilated. Systolic function was 
moderately reduced. DOPPLER: Systolic pressure was mildly to moderately 
increased. Estimated peak pressure was 45 mmHg. LEFT ATRIUM: Size was normal. 
 
RIGHT ATRIUM: The atrium was dilated. MITRAL VALVE: Normal valve structure. DOPPLER: There was no evidence for 
stenosis. There was no regurgitation. AORTIC VALVE: The valve was trileaflet. Leaflets exhibited normal 
thickness and normal cuspal separation. DOPPLER: There was no stenosis. There was no regurgitation. TRICUSPID VALVE: Normal valve structure. DOPPLER: There was no evidence 
for tricuspid stenosis. There was mild to moderate regurgitation. PULMONIC VALVE: Leaflets exhibited normal thickness, no calcification, and 
normal cuspal separation. DOPPLER: There was no regurgitation. AORTA: The root exhibited normal size. SYSTEMIC VEINS: IVC: Not visualized PERICARDIUM: There was no pericardial effusion. MEASUREMENT TABLES Doppler measurements Left ventricle   (Reference normals) Ea, lat marilynn, tiss DP   8 cm/s   (--) 
E/Ea, lat marilynn, tiss DP   6.13    (--) Ea, med marilynn, tiss DP   3 cm/s   (--) 
E/Ea, med marilynn, tiss DP   16.33    (--) Mitral valve   (Reference normals) Peak E ras   49 cm/s   (--) Peak A ras   67 cm/s   (--) Peak E/A ratio   0.73    (--) SYSTEM MEASUREMENT TABLES 
 
2D Ao Diam: 3.69 cm 
LA Diam: 3.79 cm 
%FS: 29.13 % 
EF(Teich): 56.17 % IVSd: 1.6 cm 
LVIDd: 4.36 cm 
LVIDs: 3.09 cm 
LVPWd: 1.4 cm 
SV(Teich): 48.2 ml 
EF Biplane: 62.63 % LVEDV MOD BP: 74.12 ml 
LVESV MOD BP: 27.69 ml 
LVOT Diam: 2.53 cm RVIDd: 4.44 cm 
 
CW 
AV Vmax: 1.02 m/s AV maxP.19 mmHG 
TR Vmax: 3.14 m/s AV Vmean: 0.62 m/s 
 
PW 
LVOT VTI: 13.87 cm 
LVOT Vmax: 0.73 m/s LVOT Vmean: 0.48 m/s 
MV A Ras: 0.67 m/s 
MV Dec [place]: 3.06 m/s2 MV DecT: 161.47 ms 
MV E Ras: 0.49 m/s 
MV E/A Ratio: 0.74 
ROSE (VTI): 4.2 cm2 Prepared and E-signed by Yovana Garza MD 
Signed 2013 13:57:00 Assessment and Plan: 
80 y.o. male with: 
 
Impression: 1. Dyspnea/SOB:  Etiology multifactorial, I suspect a large component of this is due to deconditioning, especially given his extensive malignancy. Other etiology contributing may be cardiomyopathy/CHFpEF given his symptoms. No recent TTE. 2. Pulmonary nodules consistent with metastatic disease -- pt diagnosed with adenocarcinoma of the GI tract via CT liver biopsy on 10/24. Pt on chemotherapy started this week 3. 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 Suspect CHFpEF 5. Restrictive lung disease:  Seen on anita from today, no ILD seen on CT chest from 10/2018 - likely due to body habitus and poor effort 6. Deconditioning Plan: 
-Spent extensive time discussing potential etiologies of pt's worsening dyspnea -- I reviewed the potential etiologies listed above and answered daughter's questions. -PFTs with MIP/MEP -- if abnormal, will consider AVAPS qhs 
-Prescribed nebulizer and supplies 
-Start albuterol nebulizer q4h PRN to see if this will help symptomatically 
-Management of his metastatic adenocarcinoma per Oncology - Dr. Nicole Quevedo -TTE with bubble study 
-Immunizations reviewed -Advised to remain active RTC: Follow-up Disposition: 
Return in about 4 weeks (around 12/13/2018). Orders Placed This Encounter  AMB POC SPIROMETRY W/BRONCHODILATOR  AMB SUPPLY ORDER  
 PFT MIP MIF/NEP NIF  
 albuterol (PROVENTIL VENTOLIN) 2.5 mg /3 mL (0.083 %) nebulizer solution Rosa Maria Rojas MD/MPH Pulmonary, Critical Care Medicine [facility]

## 2018-11-15 NOTE — LETTER
11/16/2018 5:22 PM 
 
Patient:  Tanisha Pedraza. YOB: 1933 Date of Visit: 11/15/2018 Dear Michael Ellis, KIKE 
[address] [address] VIA In Basket Reynaldo Fernandez MD 
[address] [facility] [address] [address] VIA In Basket Kunal Urbano MD 
[address] [facility] [address] [address] [place] [address] VIA Facsimile: [phone] 
 : Thank you for referring Mr. Latanya Saenz to me for evaluation/treatment. Below are the relevant portions of my assessment and plan of care. If you have questions, please do not hesitate to call me. I look forward to following Mr. Edwin Mejia along with you. Sincerely, Nelson Bull MD/MPH Pulmonary, Critical Care Medicine [facility]